# Patient Record
Sex: MALE | Race: WHITE | NOT HISPANIC OR LATINO | URBAN - METROPOLITAN AREA
[De-identification: names, ages, dates, MRNs, and addresses within clinical notes are randomized per-mention and may not be internally consistent; named-entity substitution may affect disease eponyms.]

---

## 2019-03-06 ENCOUNTER — APPOINTMENT (RX ONLY)
Dept: URBAN - METROPOLITAN AREA CLINIC 131 | Facility: CLINIC | Age: 54
Setting detail: DERMATOLOGY
End: 2019-03-06

## 2019-03-06 DIAGNOSIS — D18.0 HEMANGIOMA: ICD-10-CM

## 2019-03-06 DIAGNOSIS — D22 MELANOCYTIC NEVI: ICD-10-CM

## 2019-03-06 DIAGNOSIS — I87.2 VENOUS INSUFFICIENCY (CHRONIC) (PERIPHERAL): ICD-10-CM

## 2019-03-06 DIAGNOSIS — B35.3 TINEA PEDIS: ICD-10-CM

## 2019-03-06 DIAGNOSIS — L82.1 OTHER SEBORRHEIC KERATOSIS: ICD-10-CM

## 2019-03-06 DIAGNOSIS — L81.4 OTHER MELANIN HYPERPIGMENTATION: ICD-10-CM

## 2019-03-06 PROBLEM — I10 ESSENTIAL (PRIMARY) HYPERTENSION: Status: ACTIVE | Noted: 2019-03-06

## 2019-03-06 PROBLEM — D22.5 MELANOCYTIC NEVI OF TRUNK: Status: ACTIVE | Noted: 2019-03-06

## 2019-03-06 PROBLEM — D22.9 MELANOCYTIC NEVI, UNSPECIFIED: Status: ACTIVE | Noted: 2019-03-06

## 2019-03-06 PROBLEM — D18.01 HEMANGIOMA OF SKIN AND SUBCUTANEOUS TISSUE: Status: ACTIVE | Noted: 2019-03-06

## 2019-03-06 PROCEDURE — ? PRESCRIPTION

## 2019-03-06 PROCEDURE — ? COUNSELING

## 2019-03-06 PROCEDURE — 99203 OFFICE O/P NEW LOW 30 MIN: CPT

## 2019-03-06 PROCEDURE — ? DEFER

## 2019-03-06 RX ORDER — KETOCONAZOLE 20 MG/G
1 CREAM TOPICAL BID
Qty: 1 | Refills: 0 | Status: ERX | COMMUNITY
Start: 2019-03-06

## 2019-03-06 RX ADMIN — KETOCONAZOLE 1: 20 CREAM TOPICAL at 00:00

## 2019-03-06 ASSESSMENT — LOCATION DETAILED DESCRIPTION DERM
LOCATION DETAILED: SUPRAPUBIC SKIN
LOCATION DETAILED: RIGHT DISTAL PRETIBIAL REGION
LOCATION DETAILED: INFERIOR THORACIC SPINE
LOCATION DETAILED: LEFT DISTAL PRETIBIAL REGION

## 2019-03-06 ASSESSMENT — LOCATION ZONE DERM
LOCATION ZONE: TRUNK
LOCATION ZONE: LEG

## 2019-03-06 ASSESSMENT — LOCATION SIMPLE DESCRIPTION DERM
LOCATION SIMPLE: UPPER BACK
LOCATION SIMPLE: LEFT PRETIBIAL REGION
LOCATION SIMPLE: GROIN
LOCATION SIMPLE: RIGHT PRETIBIAL REGION

## 2019-03-06 NOTE — PROCEDURE: MIPS QUALITY
Detail Level: Detailed
Quality 130: Documentation Of Current Medications In The Medical Record: Current Medications with Name, Dosage, Frequency, or Route not Documented, Reason not Given
Quality 131: Pain Assessment And Follow-Up: Pain assessment using a standardized tool is documented as negative, no follow-up plan required
Quality 474: Zoster Vaccination Status: Shingrix Vaccination not Administered or Previously Received, Reason not Otherwise Specified

## 2019-03-06 NOTE — PROCEDURE: DEFER
Introduction Text (Please End With A Colon): The following procedure was deferred: shave biopsy
Detail Level: Detailed
Other Procedure: order ankle brachial index test

## 2020-07-16 ENCOUNTER — APPOINTMENT (RX ONLY)
Dept: URBAN - METROPOLITAN AREA CLINIC 106 | Facility: CLINIC | Age: 55
Setting detail: DERMATOLOGY
End: 2020-07-16

## 2020-07-16 VITALS — TEMPERATURE: 97.5 F

## 2020-07-16 DIAGNOSIS — L57.8 OTHER SKIN CHANGES DUE TO CHRONIC EXPOSURE TO NONIONIZING RADIATION: ICD-10-CM

## 2020-07-16 DIAGNOSIS — D22 MELANOCYTIC NEVI: ICD-10-CM

## 2020-07-16 DIAGNOSIS — L82.1 OTHER SEBORRHEIC KERATOSIS: ICD-10-CM

## 2020-07-16 DIAGNOSIS — D18.0 HEMANGIOMA: ICD-10-CM

## 2020-07-16 DIAGNOSIS — B35.3 TINEA PEDIS: ICD-10-CM

## 2020-07-16 PROBLEM — D22.5 MELANOCYTIC NEVI OF TRUNK: Status: ACTIVE | Noted: 2020-07-16

## 2020-07-16 PROBLEM — D18.01 HEMANGIOMA OF SKIN AND SUBCUTANEOUS TISSUE: Status: ACTIVE | Noted: 2020-07-16

## 2020-07-16 PROCEDURE — ? PRESCRIPTION

## 2020-07-16 PROCEDURE — ? COUNSELING

## 2020-07-16 PROCEDURE — 99214 OFFICE O/P EST MOD 30 MIN: CPT

## 2020-07-16 RX ORDER — ECONAZOLE NITRATE 10 MG/G
1 CREAM TOPICAL BID
Qty: 1 | Refills: 1 | Status: ERX | COMMUNITY
Start: 2020-07-16

## 2020-07-16 RX ADMIN — ECONAZOLE NITRATE 1: 10 CREAM TOPICAL at 00:00

## 2020-07-16 ASSESSMENT — LOCATION SIMPLE DESCRIPTION DERM
LOCATION SIMPLE: RIGHT FOREHEAD
LOCATION SIMPLE: LEFT FOOT
LOCATION SIMPLE: ABDOMEN

## 2020-07-16 ASSESSMENT — LOCATION ZONE DERM
LOCATION ZONE: FACE
LOCATION ZONE: TRUNK
LOCATION ZONE: FEET

## 2020-07-16 ASSESSMENT — LOCATION DETAILED DESCRIPTION DERM
LOCATION DETAILED: PERIUMBILICAL SKIN
LOCATION DETAILED: EPIGASTRIC SKIN
LOCATION DETAILED: LEFT LATERAL ABDOMEN
LOCATION DETAILED: 1ST WEBSPACE LEFT FOOT
LOCATION DETAILED: RIGHT INFERIOR MEDIAL FOREHEAD

## 2020-07-16 NOTE — PROCEDURE: COUNSELING
Detail Level: Generalized
Patient Specific Counseling (Will Not Stick From Patient To Patient): Lamasil OTC
Detail Level: Zone

## 2020-07-16 NOTE — PROCEDURE: MIPS QUALITY
Detail Level: Detailed
Quality 130: Documentation Of Current Medications In The Medical Record: Current Medications Documented
Additional Notes: Medications complete, not of age for pneumonia vaccine.

## 2021-09-20 ENCOUNTER — APPOINTMENT (RX ONLY)
Dept: URBAN - METROPOLITAN AREA CLINIC 106 | Facility: CLINIC | Age: 56
Setting detail: DERMATOLOGY
End: 2021-09-20

## 2021-09-20 DIAGNOSIS — D18.0 HEMANGIOMA: ICD-10-CM

## 2021-09-20 DIAGNOSIS — L57.8 OTHER SKIN CHANGES DUE TO CHRONIC EXPOSURE TO NONIONIZING RADIATION: ICD-10-CM

## 2021-09-20 DIAGNOSIS — D22 MELANOCYTIC NEVI: ICD-10-CM

## 2021-09-20 DIAGNOSIS — L70.0 ACNE VULGARIS: ICD-10-CM

## 2021-09-20 DIAGNOSIS — L73.9 FOLLICULAR DISORDER, UNSPECIFIED: ICD-10-CM

## 2021-09-20 DIAGNOSIS — L738 OTHER SPECIFIED DISEASES OF HAIR AND HAIR FOLLICLES: ICD-10-CM

## 2021-09-20 DIAGNOSIS — L663 OTHER SPECIFIED DISEASES OF HAIR AND HAIR FOLLICLES: ICD-10-CM

## 2021-09-20 DIAGNOSIS — L82.1 OTHER SEBORRHEIC KERATOSIS: ICD-10-CM

## 2021-09-20 DIAGNOSIS — L57.0 ACTINIC KERATOSIS: ICD-10-CM

## 2021-09-20 PROBLEM — D22.5 MELANOCYTIC NEVI OF TRUNK: Status: ACTIVE | Noted: 2021-09-20

## 2021-09-20 PROBLEM — D18.01 HEMANGIOMA OF SKIN AND SUBCUTANEOUS TISSUE: Status: ACTIVE | Noted: 2021-09-20

## 2021-09-20 PROBLEM — L02.222 FURUNCLE OF BACK [ANY PART, EXCEPT BUTTOCK]: Status: ACTIVE | Noted: 2021-09-20

## 2021-09-20 PROCEDURE — 17000 DESTRUCT PREMALG LESION: CPT

## 2021-09-20 PROCEDURE — ? PRESCRIPTION

## 2021-09-20 PROCEDURE — ? LIQUID NITROGEN

## 2021-09-20 PROCEDURE — ? COUNSELING

## 2021-09-20 PROCEDURE — 99214 OFFICE O/P EST MOD 30 MIN: CPT | Mod: 25

## 2021-09-20 PROCEDURE — ? ADDITIONAL NOTES

## 2021-09-20 RX ORDER — SODIUM SULFACETAMIDE AND SULFUR 80; 40 MG/ML; MG/ML
1 LOTION TOPICAL QD
Qty: 473 | Refills: 11 | Status: ERX | COMMUNITY
Start: 2021-09-20

## 2021-09-20 RX ADMIN — SODIUM SULFACETAMIDE AND SULFUR 1: 80; 40 LOTION TOPICAL at 00:00

## 2021-09-20 ASSESSMENT — LOCATION SIMPLE DESCRIPTION DERM
LOCATION SIMPLE: LEFT UPPER BACK
LOCATION SIMPLE: RIGHT FOREHEAD
LOCATION SIMPLE: RIGHT UPPER BACK
LOCATION SIMPLE: ABDOMEN

## 2021-09-20 ASSESSMENT — LOCATION DETAILED DESCRIPTION DERM
LOCATION DETAILED: LEFT SUPERIOR MEDIAL UPPER BACK
LOCATION DETAILED: LEFT LATERAL ABDOMEN
LOCATION DETAILED: RIGHT SUPERIOR UPPER BACK
LOCATION DETAILED: EPIGASTRIC SKIN
LOCATION DETAILED: RIGHT INFERIOR MEDIAL FOREHEAD
LOCATION DETAILED: LEFT INFERIOR UPPER BACK
LOCATION DETAILED: LEFT MID-UPPER BACK
LOCATION DETAILED: PERIUMBILICAL SKIN

## 2021-09-20 ASSESSMENT — LOCATION ZONE DERM
LOCATION ZONE: TRUNK
LOCATION ZONE: FACE

## 2021-09-20 NOTE — PROCEDURE: MIPS QUALITY
Additional Notes: Underage for pneumonia vaccine
Quality 111:Pneumonia Vaccination Status For Older Adults: Pneumococcal Vaccination not Administered or Previously Received, Reason not Otherwise Specified
Quality 130: Documentation Of Current Medications In The Medical Record: Current Medications Documented
Quality 226: Preventive Care And Screening: Tobacco Use: Screening And Cessation Intervention: Patient screened for tobacco use and is an ex/non-smoker
Detail Level: Detailed

## 2021-09-20 NOTE — PROCEDURE: LIQUID NITROGEN
Consent: The patient's consent was obtained including but not limited to risks of crusting, scabbing, blistering, scarring, darker or lighter pigmentary change, recurrence, incomplete removal and infection.
Show Aperture Variable?: Yes
Detail Level: Simple
Duration Of Freeze Thaw-Cycle (Seconds): 8
Number Of Freeze-Thaw Cycles: 2 freeze-thaw cycles
Post-Care Instructions: I reviewed with the patient in detail post-care instructions and written instructions were provided? apply vaseline or aquaphor daily until healed.  Patient verbalizesunderstanding

## 2021-09-20 NOTE — PROCEDURE: COUNSELING
Detail Level: Generalized
Minocycline Counseling: Patient advised regarding possible photosensitivity and discoloration of the teeth, skin, lips, tongue and gums. Patient instructed to avoid sunlight, if possible. When exposed to sunlight, patients should wear protective clothing, sunglasses, and sunscreen. The patient was instructed to call the office immediately if the following severe adverse effects occur:  hearing changes, easy bruising/bleeding, severe headache, or vision changes. The patient verbalized understanding of the proper use and possible adverse effects of minocycline. All of the patient's questions and concerns were addressed.
Azithromycin Pregnancy And Lactation Text: This medication is considered safe during pregnancy and is also secreted in breast milk.
Isotretinoin Counseling: Patient should get monthly blood tests, not donate blood, not drive at night if vision affected, not share medication, and not undergo elective surgery for 6 months after tx completed. Side effects reviewed, pt to contact office should one occur.
Detail Level: Simple
Topical Clindamycin Pregnancy And Lactation Text: This medication is Pregnancy Category B and is considered safe during pregnancy. It is unknown if it is excreted in breast milk.
Birth Control Pills Pregnancy And Lactation Text: This medication should be avoided if pregnant and for the first 30 days post-partum.
Doxycycline Counseling:  Patient counseled regarding possible photosensitivity and increased risk for sunburn. Patient instructed to avoid sunlight, if possible. When exposed to sunlight, patients should wear protective clothing, sunglasses, and sunscreen. The patient was instructed to call the office immediately if the following severe adverse effects occur:  hearing changes, easy bruising/bleeding, severe headache, or vision changes. The patient verbalized understanding of the proper use and possible adverse effects of doxycycline. All of the patient's questions and concerns were addressed.
Topical Retinoid Pregnancy And Lactation Text: This medication is Pregnancy Category C. It is unknown if this medication is excreted in breast milk.
Tetracycline Pregnancy And Lactation Text: This medication is Pregnancy Category D and not consider safe during pregnancy. It is also excreted in breast milk.
Tazorac Counseling:  Patient advised that medication is irritating and drying. Patient may need to apply sparingly and wash off after an hour before eventually leaving it on overnight. The patient verbalized understanding of the proper use and possible adverse effects of tazorac. All of the patient's questions and concerns were addressed.
Spironolactone Pregnancy And Lactation Text: This medication can cause feminization of the male fetus and should be avoided during pregnancy. The active metabolite is also found in breast milk.
Bactrim Counseling:  I discussed with the patient the risks of sulfa antibiotics including but not limited to GI upset, allergic reaction, drug rash, diarrhea, dizziness, photosensitivity, and yeast infections. Rarely, more serious reactions can occur including but not limited to aplastic anemia, agranulocytosis, methemoglobinemia, blood dyscrasias, liver or kidney failure, lung infiltrates or desquamative/blistering drug rashes.
Benzoyl Peroxide Counseling: Patient counseled that medicine may cause skin irritation and bleach clothing. In the event of skin irritation, the patient was advised to reduce the amount of the drug applied or use it less frequently. The patient verbalized understanding of the proper use and possible adverse effects of benzoyl peroxide. All of the patient's questions and concerns were addressed.
Include Pregnancy/Lactation Warning?: No
Spironolactone Counseling: Patient advised regarding risks of diarrhea, abdominal pain, hyperkalemia, birth defects (for female patients), liver toxicity and renal toxicity. The patient may need blood work to monitor liver and kidney function and potassium levels while on therapy. The patient verbalized understanding of the proper use and possible adverse effects of spironolactone. All of the patient's questions and concerns were addressed.
Doxycycline Pregnancy And Lactation Text: This medication is Pregnancy Category D and not consider safe during pregnancy. It is also excreted in breast milk but is considered safe for shorter treatment courses.
Isotretinoin Pregnancy And Lactation Text: This medication is Pregnancy Category X and is considered extremely dangerous during pregnancy. It is unknown if it is excreted in breast milk.
Bactrim Pregnancy And Lactation Text: This medication is Pregnancy Category D and is known to cause fetal risk. It is also excreted in breast milk.
Sarecycline Counseling: Patient advised regarding possible photosensitivity and discoloration of the teeth, skin, lips, tongue and gums. Patient instructed to avoid sunlight, if possible. When exposed to sunlight, patients should wear protective clothing, sunglasses, and sunscreen. The patient was instructed to call the office immediately if the following severe adverse effects occur:  hearing changes, easy bruising/bleeding, severe headache, or vision changes. The patient verbalized understanding of the proper use and possible adverse effects of sarecycline. All of the patient's questions and concerns were addressed.
High Dose Vitamin A Counseling: Side effects reviewed, pt to contact office should one occur.
Tazorac Pregnancy And Lactation Text: This medication is not safe during pregnancy. It is unknown if this medication is excreted in breast milk.
Topical Sulfur Applications Counseling: Topical Sulfur Counseling: Patient counseled that this medication may cause skin irritation or allergic reactions. In the event of skin irritation, the patient was advised to reduce the amount of the drug applied or use it less frequently. The patient verbalized understanding of the proper use and possible adverse effects of topical sulfur application. All of the patient's questions and concerns were addressed.
Dapsone Counseling: I discussed with the patient the risks of dapsone including but not limited to hemolytic anemia, agranulocytosis, rashes, methemoglobinemia, kidney failure, peripheral neuropathy, headaches, GI upset, and liver toxicity. Patients who start dapsone require monitoring including baseline LFTs and weekly CBCs for the first month, then every month thereafter. The patient verbalized understanding of the proper use and possible adverse effects of dapsone. All of the patient's questions and concerns were addressed.
Erythromycin Counseling:  I discussed with the patient the risks of erythromycin including but not limited to GI upset, allergic reaction, drug rash, diarrhea, increase in liver enzymes, and yeast infections.
Benzoyl Peroxide Pregnancy And Lactation Text: This medication is Pregnancy Category C. It is unknown if benzoyl peroxide is excreted in breast milk.
Topical Clindamycin Counseling: Patient counseled that this medication may cause skin irritation or allergic reactions. In the event of skin irritation, the patient was advised to reduce the amount of the drug applied or use it less frequently. The patient verbalized understanding of the proper use and possible adverse effects of clindamycin. All of the patient's questions and concerns were addressed.
Birth Control Pills Counseling: Birth Control Pill Counseling: I discussed with the patient the potential side effects of OCPs including but not limited to increased risk of stroke, heart attack, thrombophlebitis, deep venous thrombosis, hepatic adenomas, breast changes, GI upset, headaches, and depression. The patient verbalized understanding of the proper use and possible adverse effects of OCPs. All of the patient's questions and concerns were addressed.
Dapsone Pregnancy And Lactation Text: This medication is Pregnancy Category C and is not considered safe during pregnancy or breast feeding.
Tetracycline Counseling: Patient counseled regarding possible photosensitivity and increased risk for sunburn. Patient instructed to avoid sunlight, if possible. When exposed to sunlight, patients should wear protective clothing, sunglasses, and sunscreen. The patient was instructed to call the office immediately if the following severe adverse effects occur:  hearing changes, easy bruising/bleeding, severe headache, or vision changes. The patient verbalized understanding of the proper use and possible adverse effects of tetracycline. All of the patient's questions and concerns were addressed. Patient understands to avoid pregnancy while on therapy due to potential birth defects.
Topical Sulfur Applications Pregnancy And Lactation Text: This medication is Pregnancy Category C and has an unknown safety profile during pregnancy. It is unknown if this topical medication is excreted in breast milk.
Azithromycin Counseling:  I discussed with the patient the risks of azithromycin including but not limited to GI upset, allergic reaction, drug rash, diarrhea, and yeast infections.
High Dose Vitamin A Pregnancy And Lactation Text: High dose vitamin A therapy is contraindicated during pregnancy and breast feeding.
Topical Retinoid counseling:  Patient advised to apply a pea-sized amount only at bedtime and wait 30 minutes after washing their face before applying. If too drying, patient may add a non-comedogenic moisturizer. The patient verbalized understanding of the proper use and possible adverse effects of retinoids. All of the patient's questions and concerns were addressed.
Erythromycin Pregnancy And Lactation Text: This medication is Pregnancy Category B and is considered safe during pregnancy. It is also excreted in breast milk.

## 2021-09-20 NOTE — PROCEDURE: ADDITIONAL NOTES
Additional Notes: If sulfur wash doesnât improve acne in 6-8 weeks, pt advised to contact office for Adapalene RX.
Render Risk Assessment In Note?: no
Detail Level: Simple

## 2022-09-22 ENCOUNTER — APPOINTMENT (RX ONLY)
Dept: URBAN - METROPOLITAN AREA CLINIC 106 | Facility: CLINIC | Age: 57
Setting detail: DERMATOLOGY
End: 2022-09-22

## 2022-09-22 DIAGNOSIS — D49.2 NEOPLASM OF UNSPECIFIED BEHAVIOR OF BONE, SOFT TISSUE, AND SKIN: ICD-10-CM

## 2022-09-22 DIAGNOSIS — D22 MELANOCYTIC NEVI: ICD-10-CM

## 2022-09-22 DIAGNOSIS — T07XXXA INSECT BITE, NONVENOMOUS, OF OTHER, MULTIPLE, AND UNSPECIFIED SITES, WITHOUT MENTION OF INFECTION: ICD-10-CM

## 2022-09-22 DIAGNOSIS — D18.0 HEMANGIOMA: ICD-10-CM

## 2022-09-22 DIAGNOSIS — L82.1 OTHER SEBORRHEIC KERATOSIS: ICD-10-CM

## 2022-09-22 DIAGNOSIS — L57.8 OTHER SKIN CHANGES DUE TO CHRONIC EXPOSURE TO NONIONIZING RADIATION: ICD-10-CM

## 2022-09-22 PROBLEM — T07.XXXA UNSPECIFIED MULTIPLE INJURIES, INITIAL ENCOUNTER: Status: ACTIVE | Noted: 2022-09-22

## 2022-09-22 PROBLEM — D22.5 MELANOCYTIC NEVI OF TRUNK: Status: ACTIVE | Noted: 2022-09-22

## 2022-09-22 PROBLEM — D18.01 HEMANGIOMA OF SKIN AND SUBCUTANEOUS TISSUE: Status: ACTIVE | Noted: 2022-09-22

## 2022-09-22 PROCEDURE — ? SUNSCREEN RECOMMENDATIONS

## 2022-09-22 PROCEDURE — ? OBSERVATION

## 2022-09-22 PROCEDURE — ? COUNSELING

## 2022-09-22 PROCEDURE — ? PRESCRIPTION

## 2022-09-22 PROCEDURE — 11102 TANGNTL BX SKIN SINGLE LES: CPT

## 2022-09-22 PROCEDURE — ? BIOPSY BY SHAVE METHOD

## 2022-09-22 PROCEDURE — 99214 OFFICE O/P EST MOD 30 MIN: CPT | Mod: 25

## 2022-09-22 RX ORDER — TRIAMCINOLONE ACETONIDE 1 MG/G
1 CREAM TOPICAL BID
Qty: 80 | Refills: 1 | Status: ERX | COMMUNITY
Start: 2022-09-22

## 2022-09-22 RX ADMIN — TRIAMCINOLONE ACETONIDE 1: 1 CREAM TOPICAL at 00:00

## 2022-09-22 ASSESSMENT — LOCATION DETAILED DESCRIPTION DERM
LOCATION DETAILED: LEFT LATERAL ABDOMEN
LOCATION DETAILED: RIGHT LATERAL ABDOMEN
LOCATION DETAILED: LEFT INFERIOR MEDIAL MIDBACK
LOCATION DETAILED: EPIGASTRIC SKIN
LOCATION DETAILED: PERIUMBILICAL SKIN
LOCATION DETAILED: RIGHT INFERIOR MEDIAL FOREHEAD

## 2022-09-22 ASSESSMENT — LOCATION ZONE DERM
LOCATION ZONE: FACE
LOCATION ZONE: TRUNK

## 2022-09-22 ASSESSMENT — LOCATION SIMPLE DESCRIPTION DERM
LOCATION SIMPLE: RIGHT FOREHEAD
LOCATION SIMPLE: ABDOMEN
LOCATION SIMPLE: LEFT LOWER BACK

## 2022-09-22 NOTE — PROCEDURE: OBSERVATION
Body Location Override (Optional - Billing Will Still Be Based On Selected Body Map Location If Applicable): right abdomen
Detail Level: Simple
Size Of Lesion In Cm (Optional): 1.3
X Size Of Lesion In Cm (Optional): 0.9

## 2022-09-22 NOTE — PROCEDURE: BIOPSY BY SHAVE METHOD
Body Location Override (Optional - Billing Will Still Be Based On Selected Body Map Location If Applicable): left inferior medial mid back
Detail Level: Detailed
Depth Of Biopsy: dermis
Was A Bandage Applied: Yes
Size Of Lesion In Cm: 0.6
X Size Of Lesion In Cm: 0
Biopsy Type: H and E
Biopsy Method: Dermablade
Anesthesia Type: 2% lidocaine with epinephrine
Anesthesia Volume In Cc (Will Not Render If 0): 0.5
Hemostasis: Electrocautery
Wound Care: Vaseline
Dressing: pressure dressing
Destruction After The Procedure: No
Type Of Destruction Used: Electrodesiccation
Curettage Text: The wound bed was treated with curettage after the biopsy was performed.
Cryotherapy Text: The wound bed was treated with cryotherapy after the biopsy was performed.
Electrodesiccation Text: The wound bed was treated with electrodesiccation after the biopsy was performed.
Electrodesiccation And Curettage Text: The wound bed was treated with electrodesiccation and curettage after the biopsy was performed.
Silver Nitrate Text: The wound bed was treated with silver nitrate after the biopsy was performed.
Lab: [facility]
Lab Facility: [id number] [facility]
Consent: The provider's intent is to obtain a tissue sample solely for diagnostic purposes. Written consent to obtain tissue sample was obtained and risks were reviewed including but not limited to scarring, infection, bleeding, scabbing, incomplete removal, nerve damage and allergy to anesthesia.
Post-Care Instructions: i reviewed with patient in detail post care instructions and written instructions were provided? apply aquaphor or vaseline daily until healed with bandage. Patient verbalizes understanding.  Written instructions provided
Notification Instructions: Patient will be notified of biopsy results. However, patient instructed to call the office if not contacted within 2 weeks.
Billing Type: [employer]
Information: Selecting Yes will display possible errors in your note based on the variables you have selected. This validation is only offered as a suggestion for you. PLEASE NOTE THAT THE VALIDATION TEXT WILL BE REMOVED WHEN YOU FINALIZE YOUR NOTE. IF YOU WANT TO FAX A PRELIMINARY NOTE YOU WILL NEED TO TOGGLE THIS TO 'NO' IF YOU DO NOT WANT IT IN YOUR FAXED NOTE.

## 2022-09-22 NOTE — PROCEDURE: MIPS QUALITY
Additional Notes: Underage for pneumonia vaccine
Detail Level: Detailed
Quality 111:Pneumonia Vaccination Status For Older Adults: Pneumococcal vaccine (PPSV23) was not administered on or after patientâs 60th birthday and before the end of the measurement period, reason not otherwise specified
Quality 130: Documentation Of Current Medications In The Medical Record: Current Medications Documented

## 2022-10-17 ENCOUNTER — APPOINTMENT (RX ONLY)
Dept: URBAN - METROPOLITAN AREA CLINIC 106 | Facility: CLINIC | Age: 57
Setting detail: DERMATOLOGY
End: 2022-10-17

## 2022-10-17 PROBLEM — C43.59 MALIGNANT MELANOMA OF OTHER PART OF TRUNK: Status: ACTIVE | Noted: 2022-10-17

## 2022-10-17 PROCEDURE — ? EXCISION

## 2022-10-17 PROCEDURE — 12032 INTMD RPR S/A/T/EXT 2.6-7.5: CPT

## 2022-10-17 PROCEDURE — 11603 EXC TR-EXT MAL+MARG 2.1-3 CM: CPT

## 2022-10-17 NOTE — PROCEDURE: EXCISION
Referring Physician (Optional): [id number] Kat David
Biopsy Photograph Reviewed: Yes
Previous Accession (Optional): [id number]
Date Of Previous Biopsy (Optional): 9/22/22
Size Of Lesion In Cm: 0.8
X Size Of Lesion In Cm (Optional): 0.6
Size Of Margin In Cm: 1
Anesthesia Volume In Cc: 3
Was An Eye Clamp Used?: No
Eye Clamp Note Details: An eye clamp was used during the procedure.
Excision Method: Elliptical
Saucerization Depth: dermis and superficial adipose tissue
Repair Type: Intermediate
Suturegard Retention Suture: 2-0 Nylon
Retention Suture Bite Size: 3 mm
Length To Time In Minutes Device Was In Place: 10
Intermediate / Complex Repair - Final Wound Length In Cm: 6.9
Undermining Type: Entire Wound
Debridement Text: The wound edges were debrided prior to proceeding with the closure to facilitate wound healing.
Helical Rim Text: The closure involved the helical rim.
Vermilion Border Text: The closure involved the vermilion border.
Nostril Rim Text: The closure involved the nostril rim.
Retention Suture Text: Retention sutures were placed to support the closure and prevent dehiscence.
Primary Defect Length (In Cm): 0
Suture Removal: 14 days
Lab: [facility] [place]
Lab Facility: [address] [facility]
Graft Donor Site Bandage (Optional-Leave Blank If You Don't Want In Note): Steri-strips and a pressure bandage were applied to the donor site.
Epidermal Closure Graft Donor Site (Optional): simple interrupted
Billing Type: [employer]
Excision Depth: adipose tissue
Scalpel Size: 15 blade
Anesthesia Type: 2% lidocaine with epinephrine and a 1:10 solution of 8.4% sodium bicarbonate
Hemostasis: Electrocautery
Estimated Blood Loss (Cc): minimal
Detail Level: Detailed
Deep Sutures: 3-0 PDS
Epidermal Closure: running
Wound Care: Vaseline
Dressing: pressure dressing
Suturegard Intro: Intraoperative tissue expansion was performed, utilizing the SUTUREGARD device, in order to reduce wound tension.
Suturegard Body: The suture ends were repeatedly re-tightened and re-clamped to achieve the desired tissue expansion.
Hemigard Intro: Due to skin fragility and wound tension, it was decided to use HEMIGARD adhesive retention suture devices to permit a linear closure. The skin was cleaned and dried for a 6cm distance away from the wound. Excessive hair, if present, was removed to allow for adhesion.
Hemigard Postcare Instructions: The HEMIGARD strips are to remain completely dry for at least 5-7 days.
Positioning (Leave Blank If You Do Not Want): The patient was placed in a comfortable position exposing the surgical site.
Pre-Excision Curettage Text (Leave Blank If You Do Not Want): Prior to drawing the surgical margin the visible lesion was removed with electrodesiccation and curettage to clearly define the lesion size.
Complex Repair Preamble Text (Leave Blank If You Do Not Want): Extensive wide undermining was performed.
Intermediate Repair Preamble Text (Leave Blank If You Do Not Want): Undermining was performed with blunt dissection.
Curvilinear Excision Additional Text (Leave Blank If You Do Not Want): The margin was drawn around the clinically apparent lesion. A curvilinear shape was then drawn on the skin incorporating the lesion and margins. Incisions were then made along these lines to the appropriate tissue plane and the lesion was extirpated.
Fusiform Excision Additional Text (Leave Blank If You Do Not Want): The margin was drawn around the clinically apparent lesion. A fusiform shape was then drawn on the skin incorporating the lesion and margins. Incisions were then made along these lines to the appropriate tissue plane and the lesion was extirpated.
Elliptical Excision Additional Text (Leave Blank If You Do Not Want): The margin was drawn around the clinically apparent lesion. An elliptical shape was then drawn on the skin incorporating the lesion and margins. Incisions were then made along these lines to the appropriate tissue plane and the lesion was extirpated.
Saucerization Excision Additional Text (Leave Blank If You Do Not Want): The margin was drawn around the clinically apparent lesion. Incisions were then made along these lines, in a tangential fashion, to the appropriate tissue plane and the lesion was extirpated.
Slit Excision Additional Text (Leave Blank If You Do Not Want): A linear line was drawn on the skin overlying the lesion. An incision was made slowly until the lesion was visualized. Once visualized, the lesion was removed with blunt dissection.
Excisional Biopsy Additional Text (Leave Blank If You Do Not Want): The margin was drawn around the clinically apparent lesion. An elliptical shape was then drawn on the skin incorporating the lesion and margins.  Incisions were then made along these lines to the appropriate tissue plane and the lesion was extirpated.
Perilesional Excision Additional Text (Leave Blank If You Do Not Want): The margin was drawn around the clinically apparent lesion. Incisions were then made along these lines to the appropriate tissue plane and the lesion was extirpated.
Repair Performed By Another Provider Text (Leave Blank If You Do Not Want): After the tissue was excised the defect was repaired by another provider.
No Repair - Repaired With Adjacent Surgical Defect Text (Leave Blank If You Do Not Want): After the excision the defect was repaired concurrently with another surgical defect which was in close approximation.
Adjacent Tissue Transfer Text: The defect edges were debeveled with a #15 scalpel blade. Given the location of the defect and the proximity to free margins an adjacent tissue transfer was deemed most appropriate. Using a sterile surgical marker, an appropriate flap was drawn incorporating the defect and placing the expected incisions within the relaxed skin tension lines where possible. The area thus outlined was incised deep to adipose tissue with a #15 scalpel blade. The skin margins were undermined to an appropriate distance in all directions utilizing iris scissors.
Advancement Flap (Single) Text: The defect edges were debeveled with a #15 scalpel blade. Given the location of the defect and the proximity to free margins a single advancement flap was deemed most appropriate. Using a sterile surgical marker, an appropriate advancement flap was drawn incorporating the defect and placing the expected incisions within the relaxed skin tension lines where possible. The area thus outlined was incised deep to adipose tissue with a #15 scalpel blade. The skin margins were undermined to an appropriate distance in all directions utilizing iris scissors.
Advancement Flap (Double) Text: The defect edges were debeveled with a #15 scalpel blade. Given the location of the defect and the proximity to free margins a double advancement flap was deemed most appropriate. Using a sterile surgical marker, the appropriate advancement flaps were drawn incorporating the defect and placing the expected incisions within the relaxed skin tension lines where possible. The area thus outlined was incised deep to adipose tissue with a #15 scalpel blade. The skin margins were undermined to an appropriate distance in all directions utilizing iris scissors.
Burow's Advancement Flap Text: The defect edges were debeveled with a #15 scalpel blade. Given the location of the defect and the proximity to free margins a Burow's advancement flap was deemed most appropriate. Using a sterile surgical marker, the appropriate advancement flap was drawn incorporating the defect and placing the expected incisions within the relaxed skin tension lines where possible. The area thus outlined was incised deep to adipose tissue with a #15 scalpel blade. The skin margins were undermined to an appropriate distance in all directions utilizing iris scissors.
Chonodrocutaneous Helical Advancement Flap Text: The defect edges were debeveled with a #15 scalpel blade. Given the location of the defect and the proximity to free margins a chondrocutaneous helical advancement flap was deemed most appropriate. Using a sterile surgical marker, the appropriate advancement flap was drawn incorporating the defect and placing the expected incisions within the relaxed skin tension lines where possible. The area thus outlined was incised deep to adipose tissue with a #15 scalpel blade. The skin margins were undermined to an appropriate distance in all directions utilizing iris scissors.
Crescentic Advancement Flap Text: The defect edges were debeveled with a #15 scalpel blade. Given the location of the defect and the proximity to free margins a crescentic advancement flap was deemed most appropriate. Using a sterile surgical marker, the appropriate advancement flap was drawn incorporating the defect and placing the expected incisions within the relaxed skin tension lines where possible. The area thus outlined was incised deep to adipose tissue with a #15 scalpel blade. The skin margins were undermined to an appropriate distance in all directions utilizing iris scissors.
A-T Advancement Flap Text: The defect edges were debeveled with a #15 scalpel blade. Given the location of the defect, shape of the defect and the proximity to free margins an A-T advancement flap was deemed most appropriate. Using a sterile surgical marker, an appropriate advancement flap was drawn incorporating the defect and placing the expected incisions within the relaxed skin tension lines where possible. The area thus outlined was incised deep to adipose tissue with a #15 scalpel blade. The skin margins were undermined to an appropriate distance in all directions utilizing iris scissors.
O-T Advancement Flap Text: The defect edges were debeveled with a #15 scalpel blade. Given the location of the defect, shape of the defect and the proximity to free margins an O-T advancement flap was deemed most appropriate. Using a sterile surgical marker, an appropriate advancement flap was drawn incorporating the defect and placing the expected incisions within the relaxed skin tension lines where possible. The area thus outlined was incised deep to adipose tissue with a #15 scalpel blade. The skin margins were undermined to an appropriate distance in all directions utilizing iris scissors.
O-L Flap Text: The defect edges were debeveled with a #15 scalpel blade. Given the location of the defect, shape of the defect and the proximity to free margins an O-L flap was deemed most appropriate. Using a sterile surgical marker, an appropriate advancement flap was drawn incorporating the defect and placing the expected incisions within the relaxed skin tension lines where possible. The area thus outlined was incised deep to adipose tissue with a #15 scalpel blade. The skin margins were undermined to an appropriate distance in all directions utilizing iris scissors.
O-Z Flap Text: The defect edges were debeveled with a #15 scalpel blade. Given the location of the defect, shape of the defect and the proximity to free margins an O-Z flap was deemed most appropriate. Using a sterile surgical marker, an appropriate transposition flap was drawn incorporating the defect and placing the expected incisions within the relaxed skin tension lines where possible. The area thus outlined was incised deep to adipose tissue with a #15 scalpel blade. The skin margins were undermined to an appropriate distance in all directions utilizing iris scissors.
Double O-Z Flap Text: The defect edges were debeveled with a #15 scalpel blade. Given the location of the defect, shape of the defect and the proximity to free margins a Double O-Z flap was deemed most appropriate. Using a sterile surgical marker, an appropriate transposition flap was drawn incorporating the defect and placing the expected incisions within the relaxed skin tension lines where possible. The area thus outlined was incised deep to adipose tissue with a #15 scalpel blade. The skin margins were undermined to an appropriate distance in all directions utilizing iris scissors.
V-Y Flap Text: The defect edges were debeveled with a #15 scalpel blade. Given the location of the defect, shape of the defect and the proximity to free margins a V-Y flap was deemed most appropriate. Using a sterile surgical marker, an appropriate advancement flap was drawn incorporating the defect and placing the expected incisions within the relaxed skin tension lines where possible. The area thus outlined was incised deep to adipose tissue with a #15 scalpel blade. The skin margins were undermined to an appropriate distance in all directions utilizing iris scissors.
Advancement-Rotation Flap Text: The defect edges were debeveled with a #15 scalpel blade. Given the location of the defect, shape of the defect and the proximity to free margins an advancement-rotation flap was deemed most appropriate. Using a sterile surgical marker, an appropriate flap was drawn incorporating the defect and placing the expected incisions within the relaxed skin tension lines where possible. The area thus outlined was incised deep to adipose tissue with a #15 scalpel blade. The skin margins were undermined to an appropriate distance in all directions utilizing iris scissors.
Mercedes Flap Text: The defect edges were debeveled with a #15 scalpel blade. Given the location of the defect, shape of the defect and the proximity to free margins a Mercedes flap was deemed most appropriate. Using a sterile surgical marker, an appropriate advancement flap was drawn incorporating the defect and placing the expected incisions within the relaxed skin tension lines where possible. The area thus outlined was incised deep to adipose tissue with a #15 scalpel blade. The skin margins were undermined to an appropriate distance in all directions utilizing iris scissors.
Modified Advancement Flap Text: The defect edges were debeveled with a #15 scalpel blade. Given the location of the defect, shape of the defect and the proximity to free margins a modified advancement flap was deemed most appropriate. Using a sterile surgical marker, an appropriate advancement flap was drawn incorporating the defect and placing the expected incisions within the relaxed skin tension lines where possible. The area thus outlined was incised deep to adipose tissue with a #15 scalpel blade. The skin margins were undermined to an appropriate distance in all directions utilizing iris scissors.
Mucosal Advancement Flap Text: Given the location of the defect, shape of the defect and the proximity to free margins a mucosal advancement flap was deemed most appropriate. Incisions were made with a 15 blade scalpel in the appropriate fashion along the cutaneous vermilion border and the mucosal lip. The remaining actinically damaged mucosal tissue was excised. The mucosal advancement flap was then elevated to the gingival sulcus with care taken to preserve the neurovascular structures and advanced into the primary defect. Care was taken to ensure that precise realignment of the vermilion border was achieved.
Peng Advancement Flap Text: The defect edges were debeveled with a #15 scalpel blade. Given the location of the defect, shape of the defect and the proximity to free margins a Peng advancement flap was deemed most appropriate. Using a sterile surgical marker, an appropriate advancement flap was drawn incorporating the defect and placing the expected incisions within the relaxed skin tension lines where possible. The area thus outlined was incised deep to adipose tissue with a #15 scalpel blade. The skin margins were undermined to an appropriate distance in all directions utilizing iris scissors.
Hatchet Flap Text: The defect edges were debeveled with a #15 scalpel blade. Given the location of the defect, shape of the defect and the proximity to free margins a hatchet flap was deemed most appropriate. Using a sterile surgical marker, an appropriate hatchet flap was drawn incorporating the defect and placing the expected incisions within the relaxed skin tension lines where possible. The area thus outlined was incised deep to adipose tissue with a #15 scalpel blade. The skin margins were undermined to an appropriate distance in all directions utilizing iris scissors.
Rotation Flap Text: The defect edges were debeveled with a #15 scalpel blade. Given the location of the defect, shape of the defect and the proximity to free margins a rotation flap was deemed most appropriate. Using a sterile surgical marker, an appropriate rotation flap was drawn incorporating the defect and placing the expected incisions within the relaxed skin tension lines where possible. The area thus outlined was incised deep to adipose tissue with a #15 scalpel blade. The skin margins were undermined to an appropriate distance in all directions utilizing iris scissors.
Spiral Flap Text: The defect edges were debeveled with a #15 scalpel blade. Given the location of the defect, shape of the defect and the proximity to free margins a spiral flap was deemed most appropriate. Using a sterile surgical marker, an appropriate rotation flap was drawn incorporating the defect and placing the expected incisions within the relaxed skin tension lines where possible. The area thus outlined was incised deep to adipose tissue with a #15 scalpel blade. The skin margins were undermined to an appropriate distance in all directions utilizing iris scissors.
Staged Advancement Flap Text: The defect edges were debeveled with a #15 scalpel blade. Given the location of the defect, shape of the defect and the proximity to free margins a staged advancement flap was deemed most appropriate. Using a sterile surgical marker, an appropriate advancement flap was drawn incorporating the defect and placing the expected incisions within the relaxed skin tension lines where possible. The area thus outlined was incised deep to adipose tissue with a #15 scalpel blade. The skin margins were undermined to an appropriate distance in all directions utilizing iris scissors.
Star Wedge Flap Text: The defect edges were debeveled with a #15 scalpel blade. Given the location of the defect, shape of the defect and the proximity to free margins a star wedge flap was deemed most appropriate. Using a sterile surgical marker, an appropriate rotation flap was drawn incorporating the defect and placing the expected incisions within the relaxed skin tension lines where possible. The area thus outlined was incised deep to adipose tissue with a #15 scalpel blade. The skin margins were undermined to an appropriate distance in all directions utilizing iris scissors.
Transposition Flap Text: The defect edges were debeveled with a #15 scalpel blade. Given the location of the defect and the proximity to free margins a transposition flap was deemed most appropriate. Using a sterile surgical marker, an appropriate transposition flap was drawn incorporating the defect. The area thus outlined was incised deep to adipose tissue with a #15 scalpel blade. The skin margins were undermined to an appropriate distance in all directions utilizing iris scissors.
Muscle Hinge Flap Text: The defect edges were debeveled with a #15 scalpel blade. Given the size, depth and location of the defect and the proximity to free margins a muscle hinge flap was deemed most appropriate. Using a sterile surgical marker, an appropriate hinge flap was drawn incorporating the defect. The area thus outlined was incised with a #15 scalpel blade. The skin margins were undermined to an appropriate distance in all directions utilizing iris scissors.
Mustarde Flap Text: The defect edges were debeveled with a #15 scalpel blade. Given the size, depth and location of the defect and the proximity to free margins a Mustarde flap was deemed most appropriate. Using a sterile surgical marker, an appropriate flap was drawn incorporating the defect. The area thus outlined was incised with a #15 scalpel blade. The skin margins were undermined to an appropriate distance in all directions utilizing iris scissors.
Nasal Turnover Hinge Flap Text: The defect edges were debeveled with a #15 scalpel blade. Given the size, depth, location of the defect and the defect being full thickness a nasal turnover hinge flap was deemed most appropriate. Using a sterile surgical marker, an appropriate hinge flap was drawn incorporating the defect. The area thus outlined was incised with a #15 scalpel blade. The flap was designed to recreate the nasal mucosal lining and the alar rim. The skin margins were undermined to an appropriate distance in all directions utilizing iris scissors.
Nasalis-Muscle-Based Myocutaneous Island Pedicle Flap Text: Using a #15 blade, an incision was made around the donor flap to the level of the nasalis muscle. Wide lateral undermining was then performed in both the subcutaneous plane above the nasalis muscle, and in a submuscular plane just above periosteum. This allowed the formation of a free nasalis muscle axial pedicle (based on the angular artery) which was still attached to the actual cutaneous flap, increasing its mobility and vascular viability. Hemostasis was obtained with pinpoint electrocoagulation. The flap was mobilized into position and the pivotal anchor points positioned and stabilized with buried interrupted sutures. Subcutaneous and dermal tissues were closed in a multilayered fashion with sutures. Tissue redundancies were excised, and the epidermal edges were apposed without significant tension and sutured with sutures.
Orbicularis Oris Muscle Flap Text: The defect edges were debeveled with a #15 scalpel blade. Given that the defect affected the competency of the oral sphincter an orbicularis oris muscle flap was deemed most appropriate to restore this competency and normal muscle function. Using a sterile surgical marker, an appropriate flap was drawn incorporating the defect. The area thus outlined was incised with a #15 scalpel blade.
Melolabial Transposition Flap Text: The defect edges were debeveled with a #15 scalpel blade. Given the location of the defect and the proximity to free margins a melolabial flap was deemed most appropriate. Using a sterile surgical marker, an appropriate melolabial transposition flap was drawn incorporating the defect. The area thus outlined was incised deep to adipose tissue with a #15 scalpel blade. The skin margins were undermined to an appropriate distance in all directions utilizing iris scissors.
Rhombic Flap Text: The defect edges were debeveled with a #15 scalpel blade. Given the location of the defect and the proximity to free margins a rhombic flap was deemed most appropriate. Using a sterile surgical marker, an appropriate rhombic flap was drawn incorporating the defect. The area thus outlined was incised deep to adipose tissue with a #15 scalpel blade. The skin margins were undermined to an appropriate distance in all directions utilizing iris scissors.
Rhomboid Transposition Flap Text: The defect edges were debeveled with a #15 scalpel blade. Given the location of the defect and the proximity to free margins a rhomboid transposition flap was deemed most appropriate. Using a sterile surgical marker, an appropriate rhomboid flap was drawn incorporating the defect. The area thus outlined was incised deep to adipose tissue with a #15 scalpel blade. The skin margins were undermined to an appropriate distance in all directions utilizing iris scissors.
Bi-Rhombic Flap Text: The defect edges were debeveled with a #15 scalpel blade. Given the location of the defect and the proximity to free margins a bi-rhombic flap was deemed most appropriate. Using a sterile surgical marker, an appropriate rhombic flap was drawn incorporating the defect. The area thus outlined was incised deep to adipose tissue with a #15 scalpel blade. The skin margins were undermined to an appropriate distance in all directions utilizing iris scissors.
Helical Rim Advancement Flap Text: The defect edges were debeveled with a #15 blade scalpel. Given the location of the defect and the proximity to free margins (helical rim) a double helical rim advancement flap was deemed most appropriate. Using a sterile surgical marker, the appropriate advancement flaps were drawn incorporating the defect and placing the expected incisions between the helical rim and antihelix where possible. The area thus outlined was incised through and through with a #15 scalpel blade. With a skin hook and iris scissors, the flaps were gently and sharply undermined and freed up.
Bilateral Helical Rim Advancement Flap Text: The defect edges were debeveled with a #15 blade scalpel. Given the location of the defect and the proximity to free margins (helical rim) a bilateral helical rim advancement flap was deemed most appropriate. Using a sterile surgical marker, the appropriate advancement flaps were drawn incorporating the defect and placing the expected incisions between the helical rim and antihelix where possible. The area thus outlined was incised through and through with a #15 scalpel blade. With a skin hook and iris scissors, the flaps were gently and sharply undermined and freed up.
Ear Star Wedge Flap Text: The defect edges were debeveled with a #15 blade scalpel. Given the location of the defect and the proximity to free margins (helical rim) an ear star wedge flap was deemed most appropriate. Using a sterile surgical marker, the appropriate flap was drawn incorporating the defect and placing the expected incisions between the helical rim and antihelix where possible. The area thus outlined was incised through and through with a #15 scalpel blade.
Banner Transposition Flap Text: The defect edges were debeveled with a #15 scalpel blade. Given the location of the defect and the proximity to free margins a Banner transposition flap was deemed most appropriate. Using a sterile surgical marker, an appropriate flap drawn around the defect. The area thus outlined was incised deep to adipose tissue with a #15 scalpel blade. The skin margins were undermined to an appropriate distance in all directions utilizing iris scissors.
Bilobed Flap Text: The defect edges were debeveled with a #15 scalpel blade. Given the location of the defect and the proximity to free margins a bilobe flap was deemed most appropriate. Using a sterile surgical marker, an appropriate bilobe flap drawn around the defect. The area thus outlined was incised deep to adipose tissue with a #15 scalpel blade. The skin margins were undermined to an appropriate distance in all directions utilizing iris scissors.
Bilobed Transposition Flap Text: The defect edges were debeveled with a #15 scalpel blade. Given the location of the defect and the proximity to free margins a bilobed transposition flap was deemed most appropriate. Using a sterile surgical marker, an appropriate bilobe flap drawn around the defect. The area thus outlined was incised deep to adipose tissue with a #15 scalpel blade. The skin margins were undermined to an appropriate distance in all directions utilizing iris scissors.
Trilobed Flap Text: The defect edges were debeveled with a #15 scalpel blade. Given the location of the defect and the proximity to free margins a trilobed flap was deemed most appropriate. Using a sterile surgical marker, an appropriate trilobed flap drawn around the defect. The area thus outlined was incised deep to adipose tissue with a #15 scalpel blade. The skin margins were undermined to an appropriate distance in all directions utilizing iris scissors.
Dorsal Nasal Flap Text: The defect edges were debeveled with a #15 scalpel blade. Given the location of the defect and the proximity to free margins a dorsal nasal flap was deemed most appropriate. Using a sterile surgical marker, an appropriate dorsal nasal flap was drawn around the defect. The area thus outlined was incised deep to adipose tissue with a #15 scalpel blade. The skin margins were undermined to an appropriate distance in all directions utilizing iris scissors.
Island Pedicle Flap Text: The defect edges were debeveled with a #15 scalpel blade. Given the location of the defect, shape of the defect and the proximity to free margins an island pedicle advancement flap was deemed most appropriate. Using a sterile surgical marker, an appropriate advancement flap was drawn incorporating the defect, outlining the appropriate donor tissue and placing the expected incisions within the relaxed skin tension lines where possible. The area thus outlined was incised deep to adipose tissue with a #15 scalpel blade. The skin margins were undermined to an appropriate distance in all directions around the primary defect and laterally outward around the island pedicle utilizing iris scissors. There was minimal undermining beneath the pedicle flap.
Island Pedicle Flap With Canthal Suspension Text: The defect edges were debeveled with a #15 scalpel blade. Given the location of the defect, shape of the defect and the proximity to free margins an island pedicle advancement flap was deemed most appropriate. Using a sterile surgical marker, an appropriate advancement flap was drawn incorporating the defect, outlining the appropriate donor tissue and placing the expected incisions within the relaxed skin tension lines where possible. The area thus outlined was incised deep to adipose tissue with a #15 scalpel blade. The skin margins were undermined to an appropriate distance in all directions around the primary defect and laterally outward around the island pedicle utilizing iris scissors. There was minimal undermining beneath the pedicle flap. A suspension suture was placed in the canthal tendon to prevent tension and prevent ectropion.
Alar Island Pedicle Flap Text: The defect edges were debeveled with a #15 scalpel blade. Given the location of the defect, shape of the defect and the proximity to the alar rim an island pedicle advancement flap was deemed most appropriate. Using a sterile surgical marker, an appropriate advancement flap was drawn incorporating the defect, outlining the appropriate donor tissue and placing the expected incisions within the nasal ala running parallel to the alar rim. The area thus outlined was incised with a #15 scalpel blade. The skin margins were undermined minimally to an appropriate distance in all directions around the primary defect and laterally outward around the island pedicle utilizing iris scissors. There was minimal undermining beneath the pedicle flap.
Double Island Pedicle Flap Text: The defect edges were debeveled with a #15 scalpel blade. Given the location of the defect, shape of the defect and the proximity to free margins a double island pedicle advancement flap was deemed most appropriate. Using a sterile surgical marker, an appropriate advancement flap was drawn incorporating the defect, outlining the appropriate donor tissue and placing the expected incisions within the relaxed skin tension lines where possible. The area thus outlined was incised deep to adipose tissue with a #15 scalpel blade. The skin margins were undermined to an appropriate distance in all directions around the primary defect and laterally outward around the island pedicle utilizing iris scissors. There was minimal undermining beneath the pedicle flap.
Island Pedicle Flap-Requiring Vessel Identification Text: The defect edges were debeveled with a #15 scalpel blade. Given the location of the defect, shape of the defect and the proximity to free margins an island pedicle advancement flap was deemed most appropriate. Using a sterile surgical marker, an appropriate advancement flap was drawn, based on the axial vessel mentioned above, incorporating the defect, outlining the appropriate donor tissue and placing the expected incisions within the relaxed skin tension lines where possible. The area thus outlined was incised deep to adipose tissue with a #15 scalpel blade. The skin margins were undermined to an appropriate distance in all directions around the primary defect and laterally outward around the island pedicle utilizing iris scissors. There was minimal undermining beneath the pedicle flap.
Keystone Flap Text: The defect edges were debeveled with a #15 scalpel blade. Given the location of the defect, shape of the defect a keystone flap was deemed most appropriate. Using a sterile surgical marker, an appropriate keystone flap was drawn incorporating the defect, outlining the appropriate donor tissue and placing the expected incisions within the relaxed skin tension lines where possible. The area thus outlined was incised deep to adipose tissue with a #15 scalpel blade. The skin margins were undermined to an appropriate distance in all directions around the primary defect and laterally outward around the flap utilizing iris scissors.
O-T Plasty Text: The defect edges were debeveled with a #15 scalpel blade. Given the location of the defect, shape of the defect and the proximity to free margins an O-T plasty was deemed most appropriate. Using a sterile surgical marker, an appropriate O-T plasty was drawn incorporating the defect and placing the expected incisions within the relaxed skin tension lines where possible. The area thus outlined was incised deep to adipose tissue with a #15 scalpel blade. The skin margins were undermined to an appropriate distance in all directions utilizing iris scissors.
O-Z Plasty Text: The defect edges were debeveled with a #15 scalpel blade. Given the location of the defect, shape of the defect and the proximity to free margins an O-Z plasty (double transposition flap) was deemed most appropriate. Using a sterile surgical marker, the appropriate transposition flaps were drawn incorporating the defect and placing the expected incisions within the relaxed skin tension lines where possible. The area thus outlined was incised deep to adipose tissue with a #15 scalpel blade. The skin margins were undermined to an appropriate distance in all directions utilizing iris scissors. Hemostasis was achieved with electrocautery. The flaps were then transposed into place, one clockwise and the other counterclockwise, and anchored with interrupted buried subcutaneous sutures.
Double O-Z Plasty Text: The defect edges were debeveled with a #15 scalpel blade. Given the location of the defect, shape of the defect and the proximity to free margins a Double O-Z plasty (double transposition flap) was deemed most appropriate. Using a sterile surgical marker, the appropriate transposition flaps were drawn incorporating the defect and placing the expected incisions within the relaxed skin tension lines where possible. The area thus outlined was incised deep to adipose tissue with a #15 scalpel blade. The skin margins were undermined to an appropriate distance in all directions utilizing iris scissors. Hemostasis was achieved with electrocautery. The flaps were then transposed into place, one clockwise and the other counterclockwise, and anchored with interrupted buried subcutaneous sutures.
V-Y Plasty Text: The defect edges were debeveled with a #15 scalpel blade. Given the location of the defect, shape of the defect and the proximity to free margins an V-Y advancement flap was deemed most appropriate. Using a sterile surgical marker, an appropriate advancement flap was drawn incorporating the defect and placing the expected incisions within the relaxed skin tension lines where possible. The area thus outlined was incised deep to adipose tissue with a #15 scalpel blade. The skin margins were undermined to an appropriate distance in all directions utilizing iris scissors.
H Plasty Text: Given the location of the defect, shape of the defect and the proximity to free margins a H-plasty was deemed most appropriate for repair. Using a sterile surgical marker, the appropriate advancement arms of the H-plasty were drawn incorporating the defect and placing the expected incisions within the relaxed skin tension lines where possible. The area thus outlined was incised deep to adipose tissue with a #15 scalpel blade. The skin margins were undermined to an appropriate distance in all directions utilizing iris scissors. The opposing advancement arms were then advanced into place in opposite direction and anchored with interrupted buried subcutaneous sutures.
W Plasty Text: The lesion was extirpated to the level of the fat with a #15 scalpel blade. Given the location of the defect, shape of the defect and the proximity to free margins a W-plasty was deemed most appropriate for repair. Using a sterile surgical marker, the appropriate transposition arms of the W-plasty were drawn incorporating the defect and placing the expected incisions within the relaxed skin tension lines where possible. The area thus outlined was incised deep to adipose tissue with a #15 scalpel blade. The skin margins were undermined to an appropriate distance in all directions utilizing iris scissors. The opposing transposition arms were then transposed into place in opposite direction and anchored with interrupted buried subcutaneous sutures.
Z Plasty Text: The lesion was extirpated to the level of the fat with a #15 scalpel blade. Given the location of the defect, shape of the defect and the proximity to free margins a Z-plasty was deemed most appropriate for repair. Using a sterile surgical marker, the appropriate transposition arms of the Z-plasty were drawn incorporating the defect and placing the expected incisions within the relaxed skin tension lines where possible. The area thus outlined was incised deep to adipose tissue with a #15 scalpel blade. The skin margins were undermined to an appropriate distance in all directions utilizing iris scissors. The opposing transposition arms were then transposed into place in opposite direction and anchored with interrupted buried subcutaneous sutures.
Zygomaticofacial Flap Text: Given the location of the defect, shape of the defect and the proximity to free margins a zygomaticofacial flap was deemed most appropriate for repair. Using a sterile surgical marker, the appropriate flap was drawn incorporating the defect and placing the expected incisions within the relaxed skin tension lines where possible. The area thus outlined was incised deep to adipose tissue with a #15 scalpel blade with preservation of a vascular pedicle. The skin margins were undermined to an appropriate distance in all directions utilizing iris scissors. The flap was then placed into the defect and anchored with interrupted buried subcutaneous sutures.
Cheek Interpolation Flap Text: A decision was made to reconstruct the defect utilizing an interpolation axial flap and a staged reconstruction. A telfa template was made of the defect. This telfa template was then used to outline the Cheek Interpolation flap. The donor area for the pedicle flap was then injected with anesthesia. The flap was excised through the skin and subcutaneous tissue down to the layer of the underlying musculature. The interpolation flap was carefully excised within this deep plane to maintain its blood supply. The edges of the donor site were undermined. The donor site was closed in a primary fashion. The pedicle was then rotated into position and sutured. Once the tube was sutured into place, adequate blood supply was confirmed with blanching and refill. The pedicle was then wrapped with xeroform gauze and dressed appropriately with a telfa and gauze bandage to ensure continued blood supply and protect the attached pedicle.
Cheek-To-Nose Interpolation Flap Text: A decision was made to reconstruct the defect utilizing an interpolation axial flap and a staged reconstruction. A telfa template was made of the defect. This telfa template was then used to outline the Cheek-To-Nose Interpolation flap. The donor area for the pedicle flap was then injected with anesthesia. The flap was excised through the skin and subcutaneous tissue down to the layer of the underlying musculature. The interpolation flap was carefully excised within this deep plane to maintain its blood supply. The edges of the donor site were undermined. The donor site was closed in a primary fashion. The pedicle was then rotated into position and sutured. Once the tube was sutured into place, adequate blood supply was confirmed with blanching and refill. The pedicle was then wrapped with xeroform gauze and dressed appropriately with a telfa and gauze bandage to ensure continued blood supply and protect the attached pedicle.
Interpolation Flap Text: A decision was made to reconstruct the defect utilizing an interpolation axial flap and a staged reconstruction. A telfa template was made of the defect. This telfa template was then used to outline the interpolation flap. The donor area for the pedicle flap was then injected with anesthesia. The flap was excised through the skin and subcutaneous tissue down to the layer of the underlying musculature. The interpolation flap was carefully excised within this deep plane to maintain its blood supply. The edges of the donor site were undermined. The donor site was closed in a primary fashion. The pedicle was then rotated into position and sutured. Once the tube was sutured into place, adequate blood supply was confirmed with blanching and refill. The pedicle was then wrapped with xeroform gauze and dressed appropriately with a telfa and gauze bandage to ensure continued blood supply and protect the attached pedicle.
Melolabial Interpolation Flap Text: A decision was made to reconstruct the defect utilizing an interpolation axial flap and a staged reconstruction. A telfa template was made of the defect. This telfa template was then used to outline the melolabial interpolation flap. The donor area for the pedicle flap was then injected with anesthesia. The flap was excised through the skin and subcutaneous tissue down to the layer of the underlying musculature. The pedicle flap was carefully excised within this deep plane to maintain its blood supply. The edges of the donor site were undermined. The donor site was closed in a primary fashion. The pedicle was then rotated into position and sutured. Once the tube was sutured into place, adequate blood supply was confirmed with blanching and refill. The pedicle was then wrapped with xeroform gauze and dressed appropriately with a telfa and gauze bandage to ensure continued blood supply and protect the attached pedicle.
Mastoid Interpolation Flap Text: A decision was made to reconstruct the defect utilizing an interpolation axial flap and a staged reconstruction. A telfa template was made of the defect. This telfa template was then used to outline the mastoid interpolation flap. The donor area for the pedicle flap was then injected with anesthesia. The flap was excised through the skin and subcutaneous tissue down to the layer of the underlying musculature. The pedicle flap was carefully excised within this deep plane to maintain its blood supply. The edges of the donor site were undermined. The donor site was closed in a primary fashion. The pedicle was then rotated into position and sutured. Once the tube was sutured into place, adequate blood supply was confirmed with blanching and refill. The pedicle was then wrapped with xeroform gauze and dressed appropriately with a telfa and gauze bandage to ensure continued blood supply and protect the attached pedicle.
Posterior Auricular Interpolation Flap Text: A decision was made to reconstruct the defect utilizing an interpolation axial flap and a staged reconstruction. A telfa template was made of the defect. This telfa template was then used to outline the posterior auricular interpolation flap. The donor area for the pedicle flap was then injected with anesthesia. The flap was excised through the skin and subcutaneous tissue down to the layer of the underlying musculature. The pedicle flap was carefully excised within this deep plane to maintain its blood supply. The edges of the donor site were undermined. The donor site was closed in a primary fashion. The pedicle was then rotated into position and sutured. Once the tube was sutured into place, adequate blood supply was confirmed with blanching and refill. The pedicle was then wrapped with xeroform gauze and dressed appropriately with a telfa and gauze bandage to ensure continued blood supply and protect the attached pedicle.
Paramedian Forehead Flap Text: A decision was made to reconstruct the defect utilizing an interpolation axial flap and a staged reconstruction. A telfa template was made of the defect. This telfa template was then used to outline the paramedian forehead pedicle flap. The donor area for the pedicle flap was then injected with anesthesia. The flap was excised through the skin and subcutaneous tissue down to the layer of the underlying musculature. The pedicle flap was carefully excised within this deep plane to maintain its blood supply. The edges of the donor site were undermined. The donor site was closed in a primary fashion. The pedicle was then rotated into position and sutured. Once the tube was sutured into place, adequate blood supply was confirmed with blanching and refill. The pedicle was then wrapped with xeroform gauze and dressed appropriately with a telfa and gauze bandage to ensure continued blood supply and protect the attached pedicle.
Abbe Flap (Upper To Lower Lip) Text: The defect of the lower lip was assessed and measured. Given the location and size of the defect, an Abbe flap was deemed most appropriate. Using a sterile surgical marker, an appropriate Abbe flap was measured and drawn on the upper lip. Local anesthesia was then infiltrated. A scalpel was then used to incise the upper lip through and through the skin, vermilion, muscle and mucosa, leaving the flap pedicled on the opposite side. The flap was then rotated and transferred to the lower lip defect. The flap was then sutured into place with a three layer technique, closing the orbicularis oris muscle layer with subcutaneous buried sutures, followed by a mucosal layer and an epidermal layer.
Abbe Flap (Lower To Upper Lip) Text: The defect of the upper lip was assessed and measured. Given the location and size of the defect, an Abbe flap was deemed most appropriate. Using a sterile surgical marker, an appropriate Abbe flap was measured and drawn on the lower lip. Local anesthesia was then infiltrated. A scalpel was then used to incise the upper lip through and through the skin, vermilion, muscle and mucosa, leaving the flap pedicled on the opposite side. The flap was then rotated and transferred to the lower lip defect. The flap was then sutured into place with a three layer technique, closing the orbicularis oris muscle layer with subcutaneous buried sutures, followed by a mucosal layer and an epidermal layer.
Estlander Flap (Upper To Lower Lip) Text: The defect of the lower lip was assessed and measured. Given the location and size of the defect, an Estlander flap was deemed most appropriate. Using a sterile surgical marker, an appropriate Estlander flap was measured and drawn on the upper lip. Local anesthesia was then infiltrated. A scalpel was then used to incise the lateral aspect of the flap, through skin, muscle and mucosa, leaving the flap pedicled medially. The flap was then rotated and positioned to fill the lower lip defect. The flap was then sutured into place with a three layer technique, closing the orbicularis oris muscle layer with subcutaneous buried sutures, followed by a mucosal layer and an epidermal layer.
Estlander Flap (Lower To Upper Lip) Text: The defect of the lower lip was assessed and measured.  Given the location and size of the defect, an Estlander flap was deemed most appropriate.  Using a sterile surgical marker, an appropriate Estlander flap was measured and drawn on the upper lip. Local anesthesia was then infiltrated. A scalpel was then used to incise the lateral aspect of the flap, through skin, muscle and mucosa, leaving the flap pedicled medially.  The flap was then rotated and positioned to fill the lower lip defect.  The flap was then sutured into place with a three layer technique, closing the orbicularis oris muscle layer with subcutaneous buried sutures, followed by a mucosal layer and an epidermal layer.
Lip Wedge Excision Repair Text: Given the location of the defect and the proximity to free margins a full thickness wedge repair was deemed most appropriate. Using a sterile surgical marker, the appropriate repair was drawn incorporating the defect and placing the expected incisions perpendicular to the vermilion border. The vermilion border was also meticulously outlined to ensure appropriate reapproximation during the repair. The area thus outlined was incised through and through with a #15 scalpel blade. The muscularis and dermis were reaproximated with deep sutures following hemostasis. Care was taken to realign the vermilion border before proceeding with the superficial closure. Once the vermilion was realigned the superfical and mucosal closure was finished.
Ftsg Text: The defect edges were debeveled with a #15 scalpel blade. Given the location of the defect, shape of the defect and the proximity to free margins a full thickness skin graft was deemed most appropriate. Using a sterile surgical marker, the primary defect shape was transferred to the donor site. The area thus outlined was incised deep to adipose tissue with a #15 scalpel blade. The harvested graft was then trimmed of adipose tissue until only dermis and epidermis was left. The skin margins of the secondary defect were undermined to an appropriate distance in all directions utilizing iris scissors. The secondary defect was closed with interrupted buried subcutaneous sutures. The skin edges were then re-apposed with running  sutures. The skin graft was then placed in the primary defect and oriented appropriately.
Split-Thickness Skin Graft Text: The defect edges were debeveled with a #15 scalpel blade. Given the location of the defect, shape of the defect and the proximity to free margins a split thickness skin graft was deemed most appropriate. Using a sterile surgical marker, the primary defect shape was transferred to the donor site. The split thickness graft was then harvested. The skin graft was then placed in the primary defect and oriented appropriately.
Burow's Graft Text: The defect edges were debeveled with a #15 scalpel blade. Given the location of the defect, shape of the defect, the proximity to free margins and the presence of a standing cone deformity a Burow's skin graft was deemed most appropriate. The standing cone was removed and this tissue was then trimmed to the shape of the primary defect. The adipose tissue was also removed until only dermis and epidermis were left. The skin margins of the secondary defect were undermined to an appropriate distance in all directions utilizing iris scissors. The secondary defect was closed with interrupted buried subcutaneous sutures. The skin edges were then re-apposed with running  sutures. The skin graft was then placed in the primary defect and oriented appropriately.
Cartilage Graft Text: The defect edges were debeveled with a #15 scalpel blade. Given the location of the defect, shape of the defect, the fact the defect involved a full thickness cartilage defect a cartilage graft was deemed most appropriate. An appropriate donor site was identified, cleansed, and anesthetized. The cartilage graft was then harvested and transferred to the recipient site, oriented appropriately and then sutured into place. The secondary defect was then repaired using a primary closure.
Composite Graft Text: The defect edges were debeveled with a #15 scalpel blade. Given the location of the defect, shape of the defect, the proximity to free margins and the fact the defect was full thickness a composite graft was deemed most appropriate. The defect was outline and then transferred to the donor site. A full thickness graft was then excised from the donor site. The graft was then placed in the primary defect, oriented appropriately and then sutured into place. The secondary defect was then repaired using a primary closure.
Epidermal Autograft Text: The defect edges were debeveled with a #15 scalpel blade. Given the location of the defect, shape of the defect and the proximity to free margins an epidermal autograft was deemed most appropriate. Using a sterile surgical marker, the primary defect shape was transferred to the donor site. The epidermal graft was then harvested. The skin graft was then placed in the primary defect and oriented appropriately.
Dermal Autograft Text: The defect edges were debeveled with a #15 scalpel blade. Given the location of the defect, shape of the defect and the proximity to free margins a dermal autograft was deemed most appropriate. Using a sterile surgical marker, the primary defect shape was transferred to the donor site. The area thus outlined was incised deep to adipose tissue with a #15 scalpel blade. The harvested graft was then trimmed of adipose and epidermal tissue until only dermis was left. The skin graft was then placed in the primary defect and oriented appropriately.
Skin Substitute Text: The defect edges were debeveled with a #15 scalpel blade. Given the location of the defect, shape of the defect and the proximity to free margins a skin substitute graft was deemed most appropriate. The graft material was trimmed to fit the size of the defect. The graft was then placed in the primary defect and oriented appropriately.
Tissue Cultured Epidermal Autograft Text: The defect edges were debeveled with a #15 scalpel blade. Given the location of the defect, shape of the defect and the proximity to free margins a tissue cultured epidermal autograft was deemed most appropriate. The graft was then trimmed to fit the size of the defect. The graft was then placed in the primary defect and oriented appropriately.
Xenograft Text: The defect edges were debeveled with a #15 scalpel blade. Given the location of the defect, shape of the defect and the proximity to free margins a xenograft was deemed most appropriate. The graft was then trimmed to fit the size of the defect. The graft was then placed in the primary defect and oriented appropriately.
Purse String (Intermediate) Text: Given the location of the defect and the characteristics of the surrounding skin a pursestring intermediate closure was deemed most appropriate. Undermining was performed circumfirentially around the surgical defect. A purstring suture was then placed and tightened.
Purse String (Simple) Text: Given the location of the defect and the characteristics of the surrounding skin a purse string simple closure was deemed most appropriate. Undermining was performed circumferentially around the surgical defect. A purse string suture was then placed and tightened.
Partial Purse String (Intermediate) Text: Given the location of the defect and the characteristics of the surrounding skin an intermediate purse string closure was deemed most appropriate. Undermining was performed circumferentially around the surgical defect. A purse string suture was then placed and tightened. Wound tension of the circular defect prevented complete closure of the wound.
Partial Purse String (Simple) Text: Given the location of the defect and the characteristics of the surrounding skin a simple purse string closure was deemed most appropriate. Undermining was performed circumferentially around the surgical defect. A purse string suture was then placed and tightened. Wound tension of the circular defect prevented complete closure of the wound.
Complex Repair And Single Advancement Flap Text: The defect edges were debeveled with a #15 scalpel blade. The primary defect was closed partially with a complex linear closure. Given the location of the remaining defect, shape of the defect and the proximity to free margins a single advancement flap was deemed most appropriate for complete closure of the defect. Using a sterile surgical marker, an appropriate advancement flap was drawn incorporating the defect and placing the expected incisions within the relaxed skin tension lines where possible. The area thus outlined was incised deep to adipose tissue with a #15 scalpel blade. The skin margins were undermined to an appropriate distance in all directions utilizing iris scissors.
Complex Repair And Double Advancement Flap Text: The defect edges were debeveled with a #15 scalpel blade. The primary defect was closed partially with a complex linear closure. Given the location of the remaining defect, shape of the defect and the proximity to free margins a double advancement flap was deemed most appropriate for complete closure of the defect. Using a sterile surgical marker, an appropriate advancement flap was drawn incorporating the defect and placing the expected incisions within the relaxed skin tension lines where possible. The area thus outlined was incised deep to adipose tissue with a #15 scalpel blade. The skin margins were undermined to an appropriate distance in all directions utilizing iris scissors.
Complex Repair And Modified Advancement Flap Text: The defect edges were debeveled with a #15 scalpel blade. The primary defect was closed partially with a complex linear closure. Given the location of the remaining defect, shape of the defect and the proximity to free margins a modified advancement flap was deemed most appropriate for complete closure of the defect. Using a sterile surgical marker, an appropriate advancement flap was drawn incorporating the defect and placing the expected incisions within the relaxed skin tension lines where possible. The area thus outlined was incised deep to adipose tissue with a #15 scalpel blade. The skin margins were undermined to an appropriate distance in all directions utilizing iris scissors.
Complex Repair And A-T Advancement Flap Text: The defect edges were debeveled with a #15 scalpel blade. The primary defect was closed partially with a complex linear closure. Given the location of the remaining defect, shape of the defect and the proximity to free margins an A-T advancement flap was deemed most appropriate for complete closure of the defect. Using a sterile surgical marker, an appropriate advancement flap was drawn incorporating the defect and placing the expected incisions within the relaxed skin tension lines where possible. The area thus outlined was incised deep to adipose tissue with a #15 scalpel blade. The skin margins were undermined to an appropriate distance in all directions utilizing iris scissors.
Complex Repair And O-T Advancement Flap Text: The defect edges were debeveled with a #15 scalpel blade. The primary defect was closed partially with a complex linear closure. Given the location of the remaining defect, shape of the defect and the proximity to free margins an O-T advancement flap was deemed most appropriate for complete closure of the defect. Using a sterile surgical marker, an appropriate advancement flap was drawn incorporating the defect and placing the expected incisions within the relaxed skin tension lines where possible. The area thus outlined was incised deep to adipose tissue with a #15 scalpel blade. The skin margins were undermined to an appropriate distance in all directions utilizing iris scissors.
Complex Repair And O-L Flap Text: The defect edges were debeveled with a #15 scalpel blade. The primary defect was closed partially with a complex linear closure. Given the location of the remaining defect, shape of the defect and the proximity to free margins an O-L flap was deemed most appropriate for complete closure of the defect. Using a sterile surgical marker, an appropriate flap was drawn incorporating the defect and placing the expected incisions within the relaxed skin tension lines where possible. The area thus outlined was incised deep to adipose tissue with a #15 scalpel blade. The skin margins were undermined to an appropriate distance in all directions utilizing iris scissors.
Complex Repair And Bilobe Flap Text: The defect edges were debeveled with a #15 scalpel blade. The primary defect was closed partially with a complex linear closure. Given the location of the remaining defect, shape of the defect and the proximity to free margins a bilobe flap was deemed most appropriate for complete closure of the defect. Using a sterile surgical marker, an appropriate advancement flap was drawn incorporating the defect and placing the expected incisions within the relaxed skin tension lines where possible. The area thus outlined was incised deep to adipose tissue with a #15 scalpel blade. The skin margins were undermined to an appropriate distance in all directions utilizing iris scissors.
Complex Repair And Melolabial Flap Text: The defect edges were debeveled with a #15 scalpel blade. The primary defect was closed partially with a complex linear closure. Given the location of the remaining defect, shape of the defect and the proximity to free margins a melolabial flap was deemed most appropriate for complete closure of the defect. Using a sterile surgical marker, an appropriate advancement flap was drawn incorporating the defect and placing the expected incisions within the relaxed skin tension lines where possible. The area thus outlined was incised deep to adipose tissue with a #15 scalpel blade. The skin margins were undermined to an appropriate distance in all directions utilizing iris scissors.
Complex Repair And Rotation Flap Text: The defect edges were debeveled with a #15 scalpel blade. The primary defect was closed partially with a complex linear closure. Given the location of the remaining defect, shape of the defect and the proximity to free margins a rotation flap was deemed most appropriate for complete closure of the defect. Using a sterile surgical marker, an appropriate advancement flap was drawn incorporating the defect and placing the expected incisions within the relaxed skin tension lines where possible. The area thus outlined was incised deep to adipose tissue with a #15 scalpel blade. The skin margins were undermined to an appropriate distance in all directions utilizing iris scissors.
Complex Repair And Rhombic Flap Text: The defect edges were debeveled with a #15 scalpel blade. The primary defect was closed partially with a complex linear closure. Given the location of the remaining defect, shape of the defect and the proximity to free margins a rhombic flap was deemed most appropriate for complete closure of the defect. Using a sterile surgical marker, an appropriate advancement flap was drawn incorporating the defect and placing the expected incisions within the relaxed skin tension lines where possible. The area thus outlined was incised deep to adipose tissue with a #15 scalpel blade. The skin margins were undermined to an appropriate distance in all directions utilizing iris scissors.
Complex Repair And Transposition Flap Text: The defect edges were debeveled with a #15 scalpel blade. The primary defect was closed partially with a complex linear closure. Given the location of the remaining defect, shape of the defect and the proximity to free margins a transposition flap was deemed most appropriate for complete closure of the defect. Using a sterile surgical marker, an appropriate advancement flap was drawn incorporating the defect and placing the expected incisions within the relaxed skin tension lines where possible. The area thus outlined was incised deep to adipose tissue with a #15 scalpel blade. The skin margins were undermined to an appropriate distance in all directions utilizing iris scissors.
Complex Repair And V-Y Plasty Text: The defect edges were debeveled with a #15 scalpel blade. The primary defect was closed partially with a complex linear closure. Given the location of the remaining defect, shape of the defect and the proximity to free margins a V-Y plasty was deemed most appropriate for complete closure of the defect. Using a sterile surgical marker, an appropriate advancement flap was drawn incorporating the defect and placing the expected incisions within the relaxed skin tension lines where possible. The area thus outlined was incised deep to adipose tissue with a #15 scalpel blade. The skin margins were undermined to an appropriate distance in all directions utilizing iris scissors.
Complex Repair And M Plasty Text: The defect edges were debeveled with a #15 scalpel blade. The primary defect was closed partially with a complex linear closure. Given the location of the remaining defect, shape of the defect and the proximity to free margins an M plasty was deemed most appropriate for complete closure of the defect. Using a sterile surgical marker, an appropriate advancement flap was drawn incorporating the defect and placing the expected incisions within the relaxed skin tension lines where possible. The area thus outlined was incised deep to adipose tissue with a #15 scalpel blade. The skin margins were undermined to an appropriate distance in all directions utilizing iris scissors.
Complex Repair And Double M Plasty Text: The defect edges were debeveled with a #15 scalpel blade. The primary defect was closed partially with a complex linear closure. Given the location of the remaining defect, shape of the defect and the proximity to free margins a double M plasty was deemed most appropriate for complete closure of the defect. Using a sterile surgical marker, an appropriate advancement flap was drawn incorporating the defect and placing the expected incisions within the relaxed skin tension lines where possible. The area thus outlined was incised deep to adipose tissue with a #15 scalpel blade. The skin margins were undermined to an appropriate distance in all directions utilizing iris scissors.
Complex Repair And W Plasty Text: The defect edges were debeveled with a #15 scalpel blade. The primary defect was closed partially with a complex linear closure. Given the location of the remaining defect, shape of the defect and the proximity to free margins a W plasty was deemed most appropriate for complete closure of the defect. Using a sterile surgical marker, an appropriate advancement flap was drawn incorporating the defect and placing the expected incisions within the relaxed skin tension lines where possible. The area thus outlined was incised deep to adipose tissue with a #15 scalpel blade. The skin margins were undermined to an appropriate distance in all directions utilizing iris scissors.
Complex Repair And Z Plasty Text: The defect edges were debeveled with a #15 scalpel blade. The primary defect was closed partially with a complex linear closure. Given the location of the remaining defect, shape of the defect and the proximity to free margins a Z plasty was deemed most appropriate for complete closure of the defect. Using a sterile surgical marker, an appropriate advancement flap was drawn incorporating the defect and placing the expected incisions within the relaxed skin tension lines where possible. The area thus outlined was incised deep to adipose tissue with a #15 scalpel blade. The skin margins were undermined to an appropriate distance in all directions utilizing iris scissors.
Complex Repair And Dorsal Nasal Flap Text: The defect edges were debeveled with a #15 scalpel blade. The primary defect was closed partially with a complex linear closure. Given the location of the remaining defect, shape of the defect and the proximity to free margins a dorsal nasal flap was deemed most appropriate for complete closure of the defect. Using a sterile surgical marker, an appropriate flap was drawn incorporating the defect and placing the expected incisions within the relaxed skin tension lines where possible. The area thus outlined was incised deep to adipose tissue with a #15 scalpel blade. The skin margins were undermined to an appropriate distance in all directions utilizing iris scissors.
Complex Repair And Ftsg Text: The defect edges were debeveled with a #15 scalpel blade. The primary defect was closed partially with a complex linear closure. Given the location of the defect, shape of the defect and the proximity to free margins a full thickness skin graft was deemed most appropriate to repair the remaining defect. The graft was trimmed to fit the size of the remaining defect. The graft was then placed in the primary defect, oriented appropriately, and sutured into place.
Complex Repair And Burow's Graft Text: The defect edges were debeveled with a #15 scalpel blade. The primary defect was closed partially with a complex linear closure. Given the location of the defect, shape of the defect, the proximity to free margins and the presence of a standing cone deformity a Burow's graft was deemed most appropriate to repair the remaining defect. The graft was trimmed to fit the size of the remaining defect. The graft was then placed in the primary defect, oriented appropriately, and sutured into place.
Complex Repair And Split-Thickness Skin Graft Text: The defect edges were debeveled with a #15 scalpel blade. The primary defect was closed partially with a complex linear closure. Given the location of the defect, shape of the defect and the proximity to free margins a split thickness skin graft was deemed most appropriate to repair the remaining defect. The graft was trimmed to fit the size of the remaining defect. The graft was then placed in the primary defect, oriented appropriately, and sutured into place.
Complex Repair And Epidermal Autograft Text: The defect edges were debeveled with a #15 scalpel blade. The primary defect was closed partially with a complex linear closure. Given the location of the defect, shape of the defect and the proximity to free margins an epidermal autograft was deemed most appropriate to repair the remaining defect. The graft was trimmed to fit the size of the remaining defect. The graft was then placed in the primary defect, oriented appropriately, and sutured into place.
Complex Repair And Dermal Autograft Text: The defect edges were debeveled with a #15 scalpel blade. The primary defect was closed partially with a complex linear closure. Given the location of the defect, shape of the defect and the proximity to free margins an dermal autograft was deemed most appropriate to repair the remaining defect. The graft was trimmed to fit the size of the remaining defect. The graft was then placed in the primary defect, oriented appropriately, and sutured into place.
Complex Repair And Tissue Cultured Epidermal Autograft Text: The defect edges were debeveled with a #15 scalpel blade. The primary defect was closed partially with a complex linear closure. Given the location of the defect, shape of the defect and the proximity to free margins an tissue cultured epidermal autograft was deemed most appropriate to repair the remaining defect. The graft was trimmed to fit the size of the remaining defect. The graft was then placed in the primary defect, oriented appropriately, and sutured into place.
Complex Repair And Xenograft Text: The defect edges were debeveled with a #15 scalpel blade. The primary defect was closed partially with a complex linear closure. Given the location of the defect, shape of the defect and the proximity to free margins a xenograft was deemed most appropriate to repair the remaining defect. The graft was trimmed to fit the size of the remaining defect. The graft was then placed in the primary defect, oriented appropriately, and sutured into place.
Complex Repair And Skin Substitute Graft Text: The defect edges were debeveled with a #15 scalpel blade. The primary defect was closed partially with a complex linear closure. Given the location of the remaining defect, shape of the defect and the proximity to free margins a skin substitute graft was deemed most appropriate to repair the remaining defect. The graft was trimmed to fit the size of the remaining defect. The graft was then placed in the primary defect, oriented appropriately, and sutured into place.
Path Notes (To The Dermatopathologist): Please check margins.
Consent: The provider's intent is to therapeutically remove the lesion in its entirety; extending to the fat layer; while at the same time obtaining a tissue sample for histopathologic examination. Consent was obtained from the patient. The risks and benefits to therapy were discussed in detail. Specifically, the risks of infection, scarring, bleeding, prolonged wound healing, incomplete removal, allergy to anesthesia, nerve injury and recurrence were addressed. Prior to the procedure, the treatment site was clearly identified and confirmed by the patient. All components of Universal Protocol/PAUSE Rule completed.
Post-Care Instructions: I reviewed with the patient in detail post-care instructions. Patient is not to engage in any heavy lifting, exercise, or swimming for the next 14 days. Should the patient develop any fevers, chills, bleeding, severe pain patient will contact the office immediately.
Home Suture Removal Text: Patient was provided a home suture removal kit and will remove their sutures at home. If they have any questions or difficulties they will call the office.
Where Do You Want The Question To Include Opioid Counseling Located?: Case Summary Tab
Information: Selecting Yes will display possible errors in your note based on the variables you have selected. This validation is only offered as a suggestion for you. PLEASE NOTE THAT THE VALIDATION TEXT WILL BE REMOVED WHEN YOU FINALIZE YOUR NOTE. IF YOU WANT TO FAX A PRELIMINARY NOTE YOU WILL NEED TO TOGGLE THIS TO 'NO' IF YOU DO NOT WANT IT IN YOUR FAXED NOTE.

## 2022-10-31 ENCOUNTER — APPOINTMENT (RX ONLY)
Dept: URBAN - METROPOLITAN AREA CLINIC 106 | Facility: CLINIC | Age: 57
Setting detail: DERMATOLOGY
End: 2022-10-31

## 2022-10-31 DIAGNOSIS — Z48.02 ENCOUNTER FOR REMOVAL OF SUTURES: ICD-10-CM

## 2022-10-31 DIAGNOSIS — Z85.820 PERSONAL HISTORY OF MALIGNANT MELANOMA OF SKIN: ICD-10-CM

## 2022-10-31 PROCEDURE — ? COUNSELING

## 2022-10-31 PROCEDURE — ? SUTURE REMOVAL (GLOBAL PERIOD)

## 2022-10-31 PROCEDURE — 99024 POSTOP FOLLOW-UP VISIT: CPT

## 2022-10-31 ASSESSMENT — LOCATION DETAILED DESCRIPTION DERM: LOCATION DETAILED: LEFT INFERIOR MEDIAL MIDBACK

## 2022-10-31 ASSESSMENT — LOCATION ZONE DERM: LOCATION ZONE: TRUNK

## 2022-10-31 ASSESSMENT — LOCATION SIMPLE DESCRIPTION DERM: LOCATION SIMPLE: LEFT LOWER BACK

## 2022-10-31 NOTE — PROCEDURE: SUTURE REMOVAL (GLOBAL PERIOD)
Detail Level: Detailed
Add 65706 Cpt? (Important Note: In 2017 The Use Of 76845 Is Being Tracked By Cms To Determine Future Global Period Reimbursement For Global Periods): yes

## 2023-01-11 ENCOUNTER — APPOINTMENT (RX ONLY)
Dept: URBAN - METROPOLITAN AREA CLINIC 106 | Facility: CLINIC | Age: 58
Setting detail: DERMATOLOGY
End: 2023-01-11

## 2023-01-11 DIAGNOSIS — D18.0 HEMANGIOMA: ICD-10-CM

## 2023-01-11 DIAGNOSIS — L57.0 ACTINIC KERATOSIS: ICD-10-CM

## 2023-01-11 DIAGNOSIS — B35.1 TINEA UNGUIUM: ICD-10-CM

## 2023-01-11 DIAGNOSIS — Z85.820 PERSONAL HISTORY OF MALIGNANT MELANOMA OF SKIN: ICD-10-CM

## 2023-01-11 DIAGNOSIS — L82.1 OTHER SEBORRHEIC KERATOSIS: ICD-10-CM

## 2023-01-11 DIAGNOSIS — L57.8 OTHER SKIN CHANGES DUE TO CHRONIC EXPOSURE TO NONIONIZING RADIATION: ICD-10-CM

## 2023-01-11 DIAGNOSIS — D22 MELANOCYTIC NEVI: ICD-10-CM

## 2023-01-11 PROBLEM — D18.01 HEMANGIOMA OF SKIN AND SUBCUTANEOUS TISSUE: Status: ACTIVE | Noted: 2023-01-11

## 2023-01-11 PROBLEM — D22.5 MELANOCYTIC NEVI OF TRUNK: Status: ACTIVE | Noted: 2023-01-11

## 2023-01-11 PROCEDURE — 17000 DESTRUCT PREMALG LESION: CPT

## 2023-01-11 PROCEDURE — ? LIQUID NITROGEN

## 2023-01-11 PROCEDURE — ? COUNSELING

## 2023-01-11 PROCEDURE — ? SUNSCREEN RECOMMENDATIONS

## 2023-01-11 PROCEDURE — 99214 OFFICE O/P EST MOD 30 MIN: CPT | Mod: 25

## 2023-01-11 PROCEDURE — ? PRESCRIPTION

## 2023-01-11 RX ORDER — EFINACONAZOLE 100 MG/ML
1 SOLUTION TOPICAL BID
Qty: 4 | Refills: 6 | Status: ERX | COMMUNITY
Start: 2023-01-11

## 2023-01-11 RX ADMIN — EFINACONAZOLE 1: 100 SOLUTION TOPICAL at 00:00

## 2023-01-11 ASSESSMENT — LOCATION DETAILED DESCRIPTION DERM
LOCATION DETAILED: EPIGASTRIC SKIN
LOCATION DETAILED: LEFT DORSAL GREAT TOE
LOCATION DETAILED: RIGHT INFERIOR MEDIAL FOREHEAD
LOCATION DETAILED: LEFT SUPERIOR LATERAL MALAR CHEEK
LOCATION DETAILED: PERIUMBILICAL SKIN
LOCATION DETAILED: LEFT LATERAL ABDOMEN

## 2023-01-11 ASSESSMENT — LOCATION ZONE DERM
LOCATION ZONE: TRUNK
LOCATION ZONE: FACE
LOCATION ZONE: TOE

## 2023-01-11 ASSESSMENT — LOCATION SIMPLE DESCRIPTION DERM
LOCATION SIMPLE: RIGHT FOREHEAD
LOCATION SIMPLE: LEFT CHEEK
LOCATION SIMPLE: ABDOMEN
LOCATION SIMPLE: LEFT GREAT TOE

## 2023-01-11 NOTE — PROCEDURE: LIQUID NITROGEN
Number Of Freeze-Thaw Cycles: 2 freeze-thaw cycles
Consent: The patient's consent was obtained including but not limited to risks of crusting, scabbing, blistering, scarring, darker or lighter pigmentary change, recurrence, incomplete removal and infection.
Show Applicator Variable?: Yes
Application Tool (Optional): Liquid Nitrogen Sprayer
Detail Level: Detailed
Post-Care Instructions: I reviewed with the patient in detail both written and verbal post care instructions. Avoid swimming until the treated area heals. patient verbalizes understanding. Written instructions provided.
Duration Of Freeze Thaw-Cycle (Seconds): 8

## 2023-04-07 ENCOUNTER — APPOINTMENT (RX ONLY)
Dept: URBAN - METROPOLITAN AREA CLINIC 106 | Facility: CLINIC | Age: 58
Setting detail: DERMATOLOGY
End: 2023-04-07

## 2023-04-07 DIAGNOSIS — Z71.89 OTHER SPECIFIED COUNSELING: ICD-10-CM

## 2023-04-07 DIAGNOSIS — L82.1 OTHER SEBORRHEIC KERATOSIS: ICD-10-CM

## 2023-04-07 DIAGNOSIS — D18.0 HEMANGIOMA: ICD-10-CM

## 2023-04-07 DIAGNOSIS — D49.2 NEOPLASM OF UNSPECIFIED BEHAVIOR OF BONE, SOFT TISSUE, AND SKIN: ICD-10-CM

## 2023-04-07 DIAGNOSIS — A49.02 METHICILLIN RESISTANT STAPHYLOCOCCUS AUREUS INFECTION, UNSPECIFIED SITE: ICD-10-CM

## 2023-04-07 DIAGNOSIS — L663 OTHER SPECIFIED DISEASES OF HAIR AND HAIR FOLLICLES: ICD-10-CM

## 2023-04-07 DIAGNOSIS — L73.9 FOLLICULAR DISORDER, UNSPECIFIED: ICD-10-CM

## 2023-04-07 DIAGNOSIS — L57.8 OTHER SKIN CHANGES DUE TO CHRONIC EXPOSURE TO NONIONIZING RADIATION: ICD-10-CM

## 2023-04-07 DIAGNOSIS — L738 OTHER SPECIFIED DISEASES OF HAIR AND HAIR FOLLICLES: ICD-10-CM

## 2023-04-07 DIAGNOSIS — L85.3 XEROSIS CUTIS: ICD-10-CM

## 2023-04-07 DIAGNOSIS — Z85.828 PERSONAL HISTORY OF OTHER MALIGNANT NEOPLASM OF SKIN: ICD-10-CM

## 2023-04-07 DIAGNOSIS — L81.4 OTHER MELANIN HYPERPIGMENTATION: ICD-10-CM

## 2023-04-07 DIAGNOSIS — Z85.820 PERSONAL HISTORY OF MALIGNANT MELANOMA OF SKIN: ICD-10-CM

## 2023-04-07 PROBLEM — L02.222 FURUNCLE OF BACK [ANY PART, EXCEPT BUTTOCK]: Status: ACTIVE | Noted: 2023-04-07

## 2023-04-07 PROBLEM — D18.01 HEMANGIOMA OF SKIN AND SUBCUTANEOUS TISSUE: Status: ACTIVE | Noted: 2023-04-07

## 2023-04-07 PROBLEM — L02.223 FURUNCLE OF CHEST WALL: Status: ACTIVE | Noted: 2023-04-07

## 2023-04-07 PROCEDURE — 99213 OFFICE O/P EST LOW 20 MIN: CPT | Mod: 25

## 2023-04-07 PROCEDURE — ? BIOPSY BY SHAVE METHOD

## 2023-04-07 PROCEDURE — ? ADDITIONAL NOTES

## 2023-04-07 PROCEDURE — ? PHOTO-DOCUMENTATION

## 2023-04-07 PROCEDURE — ? COUNSELING

## 2023-04-07 PROCEDURE — 11103 TANGNTL BX SKIN EA SEP/ADDL: CPT

## 2023-04-07 PROCEDURE — 11102 TANGNTL BX SKIN SINGLE LES: CPT

## 2023-04-07 PROCEDURE — ? ORDER TESTS

## 2023-04-07 ASSESSMENT — LOCATION SIMPLE DESCRIPTION DERM
LOCATION SIMPLE: LEFT UPPER BACK
LOCATION SIMPLE: LEFT LOWER BACK
LOCATION SIMPLE: UPPER BACK
LOCATION SIMPLE: RIGHT LOWER BACK
LOCATION SIMPLE: CHEST
LOCATION SIMPLE: RIGHT UPPER BACK
LOCATION SIMPLE: LOWER BACK
LOCATION SIMPLE: ABDOMEN

## 2023-04-07 ASSESSMENT — LOCATION DETAILED DESCRIPTION DERM
LOCATION DETAILED: LEFT MEDIAL INFERIOR CHEST
LOCATION DETAILED: RIGHT MEDIAL UPPER BACK
LOCATION DETAILED: SUPERIOR LUMBAR SPINE
LOCATION DETAILED: EPIGASTRIC SKIN
LOCATION DETAILED: LEFT MEDIAL UPPER BACK
LOCATION DETAILED: SUPERIOR THORACIC SPINE
LOCATION DETAILED: INFERIOR THORACIC SPINE
LOCATION DETAILED: STERNUM
LOCATION DETAILED: RIGHT SUPERIOR UPPER BACK
LOCATION DETAILED: LEFT SUPERIOR MEDIAL LOWER BACK
LOCATION DETAILED: LEFT MEDIAL SUPERIOR CHEST
LOCATION DETAILED: PERIUMBILICAL SKIN
LOCATION DETAILED: RIGHT SUPERIOR MEDIAL MIDBACK

## 2023-04-07 ASSESSMENT — LOCATION ZONE DERM: LOCATION ZONE: TRUNK

## 2023-04-07 NOTE — PROCEDURE: BIOPSY BY SHAVE METHOD
Body Location Override (Optional - Billing Will Still Be Based On Selected Body Map Location If Applicable): right mid back
Detail Level: Detailed
Depth Of Biopsy: dermis
Was A Bandage Applied: Yes
Size Of Lesion In Cm: 0.7
X Size Of Lesion In Cm: 0
Biopsy Type: H and E
Biopsy Method: Dermablade
Anesthesia Type: bacteriostatic saline
Anesthesia Volume In Cc (Will Not Render If 0): 0.5
Hemostasis: Drysol
Wound Care: Petrolatum
Dressing: bandage
Destruction After The Procedure: No
Type Of Destruction Used: Curettage
Curettage Text: The wound bed was treated with curettage after the biopsy was performed.
Cryotherapy Text: The wound bed was treated with cryotherapy after the biopsy was performed.
Electrodesiccation Text: The wound bed was treated with electrodesiccation after the biopsy was performed.
Electrodesiccation And Curettage Text: The wound bed was treated with electrodesiccation and curettage after the biopsy was performed.
Silver Nitrate Text: The wound bed was treated with silver nitrate after the biopsy was performed.
Lab: [facility]
Lab Facility: [id number] [facility]
Consent: Written consent was obtained and risks were reviewed including but not limited to scarring, infection, bleeding, scabbing, incomplete removal, nerve damage and allergy to anesthesia.
Post-Care Instructions: I reviewed with the patient in detail post-care instructions. Patient is to keep the biopsy site dry overnight, and then apply bacitracin twice daily until healed. Patient may apply hydrogen peroxide soaks to remove any crusting.  Written instructions provided
Notification Instructions: Patient will be notified of biopsy results. However, patient instructed to call the office if not contacted within 2 weeks.
Billing Type: [employer]
Information: Selecting Yes will display possible errors in your note based on the variables you have selected. This validation is only offered as a suggestion for you. PLEASE NOTE THAT THE VALIDATION TEXT WILL BE REMOVED WHEN YOU FINALIZE YOUR NOTE. IF YOU WANT TO FAX A PRELIMINARY NOTE YOU WILL NEED TO TOGGLE THIS TO 'NO' IF YOU DO NOT WANT IT IN YOUR FAXED NOTE.
Body Location Override (Optional - Billing Will Still Be Based On Selected Body Map Location If Applicable): left medial lower back
Lab: [id number]
Lab Facility: [id number]
Post-Care Instructions: I reviewed with the patient in detail post-care instructions. Patient is to keep the biopsy site dry overnight, and then apply bacitracin twice daily until healed. Patient may apply hydrogen peroxide soaks to remove any crusting. Written instructions provided
Billing Type: Third-Party Bill

## 2023-04-07 NOTE — PROCEDURE: COUNSELING
Quality 137: Melanoma: Continuity Of Care - Recall System: Patient information entered into a recall system that includes: target date for the next exam specified AND a process to follow up with patients regarding missed or unscheduled appointments
Detail Level: Detailed
Detail Level: Zone
Detail Level: Generalized
Sunscreen Recommendations: I recommended a broad spectrum sunscreen with a SPF of 30 or higher. I explained that SPF 30 sunscreens block approximately 97 percent of the sun's harmful rays. Sunscreens should be applied at least 15 minutes prior to expected sun exposure and then every 2 hours after that as long as sun exposure continues. If swimming or exercising sunscreen should be reapplied every 45 minutes to an hour after getting wet or sweating. One ounce, or the equivalent of a shot glass full of sunscreen, is adequate to protect the skin not covered by a bathing suit. I also recommended a lip balm with a sunscreen as well. Sun protective clothing can be used in lieu of sunscreen but must be worn the entire time you are exposed to the sun's rays. \\n\\nProducts Recommended: Magda Liz MD
Detail Level: Simple

## 2023-04-07 NOTE — PROCEDURE: ORDER TESTS
Lab Facility: 0
Performing Laboratory: -[id number]
Bill For Surgical Tray: no
Billing Type: [employer]
Expected Date Of Service: 04/07/2023
Clinical Notes (To The Lab): please identify any questionable organisms and do a sensitivity test

## 2023-04-07 NOTE — HPI: EVALUATION OF SKIN LESION(S)
What Type Of Note Output Would You Prefer (Optional)?: Bullet Format
How Severe Are Your Spot(S)?: mild
Have Your Spot(S) Been Treated In The Past?: has not been treated
Hpi Title: Evaluation of Skin Lesions
Location: Left medial back
Year Removed: 2022

## 2023-04-07 NOTE — PROCEDURE: ADDITIONAL NOTES
Detail Level: Simple
Render Risk Assessment In Note?: no
Additional Notes: Recommended to wash with hibiclens

## 2023-04-20 ENCOUNTER — RX ONLY (OUTPATIENT)
Age: 58
Setting detail: RX ONLY
End: 2023-04-20

## 2023-04-20 RX ORDER — DOXYCYCLINE HYCLATE 100 MG/1
1 CAPSULE, GELATIN COATED ORAL BID
Qty: 28 | Refills: 0 | Status: ERX | COMMUNITY
Start: 2023-04-20